# Patient Record
Sex: MALE | Race: BLACK OR AFRICAN AMERICAN | NOT HISPANIC OR LATINO | Employment: FULL TIME | URBAN - METROPOLITAN AREA
[De-identification: names, ages, dates, MRNs, and addresses within clinical notes are randomized per-mention and may not be internally consistent; named-entity substitution may affect disease eponyms.]

---

## 2023-06-08 DIAGNOSIS — E78.00 PURE HYPERCHOLESTEROLEMIA, UNSPECIFIED: ICD-10-CM

## 2023-06-08 DIAGNOSIS — I10 ESSENTIAL (PRIMARY) HYPERTENSION: ICD-10-CM

## 2023-06-08 DIAGNOSIS — R05.3 CHRONIC COUGH: ICD-10-CM

## 2023-06-08 RX ORDER — PANTOPRAZOLE SODIUM 40 MG/1
TABLET, DELAYED RELEASE ORAL
Qty: 90 TABLET | Refills: 3 | Status: SHIPPED | OUTPATIENT
Start: 2023-06-08 | End: 2024-06-08

## 2023-06-08 RX ORDER — ROSUVASTATIN CALCIUM 20 MG/1
TABLET, COATED ORAL
Qty: 90 TABLET | Refills: 3 | Status: SHIPPED | OUTPATIENT
Start: 2023-06-08 | End: 2024-06-08

## 2023-06-08 RX ORDER — FELODIPINE 5 MG/1
TABLET, EXTENDED RELEASE ORAL
Qty: 90 TABLET | Refills: 3 | Status: SHIPPED | OUTPATIENT
Start: 2023-06-08

## 2023-07-06 ENCOUNTER — APPOINTMENT (OUTPATIENT)
Dept: PRIMARY CARE | Facility: CLINIC | Age: 63
End: 2023-07-06
Payer: COMMERCIAL

## 2023-08-03 DIAGNOSIS — E11.9 TYPE 2 DIABETES MELLITUS WITHOUT COMPLICATIONS (MULTI): ICD-10-CM

## 2023-08-03 RX ORDER — METFORMIN HYDROCHLORIDE 1000 MG/1
1000 TABLET ORAL 2 TIMES DAILY
Qty: 180 TABLET | Refills: 3 | Status: SHIPPED | OUTPATIENT
Start: 2023-08-03

## 2023-09-07 ENCOUNTER — OFFICE VISIT (OUTPATIENT)
Dept: PRIMARY CARE | Facility: CLINIC | Age: 63
End: 2023-09-07
Payer: COMMERCIAL

## 2023-09-07 VITALS
HEIGHT: 71 IN | DIASTOLIC BLOOD PRESSURE: 84 MMHG | WEIGHT: 283 LBS | BODY MASS INDEX: 39.62 KG/M2 | TEMPERATURE: 96.2 F | SYSTOLIC BLOOD PRESSURE: 120 MMHG | HEART RATE: 89 BPM

## 2023-09-07 DIAGNOSIS — E78.00 HYPERCHOLESTEROLEMIA: ICD-10-CM

## 2023-09-07 DIAGNOSIS — E11.65 UNCONTROLLED TYPE 2 DIABETES MELLITUS WITH HYPERGLYCEMIA (MULTI): ICD-10-CM

## 2023-09-07 DIAGNOSIS — E66.01 CLASS 2 SEVERE OBESITY DUE TO EXCESS CALORIES WITH SERIOUS COMORBIDITY AND BODY MASS INDEX (BMI) OF 39.0 TO 39.9 IN ADULT (MULTI): ICD-10-CM

## 2023-09-07 DIAGNOSIS — Z12.5 PROSTATE CANCER SCREENING: ICD-10-CM

## 2023-09-07 DIAGNOSIS — Z23 NEED FOR INFLUENZA VACCINATION: ICD-10-CM

## 2023-09-07 DIAGNOSIS — I10 BENIGN ESSENTIAL HYPERTENSION: Primary | ICD-10-CM

## 2023-09-07 DIAGNOSIS — J84.9 INTERSTITIAL LUNG DISEASE (MULTI): ICD-10-CM

## 2023-09-07 PROBLEM — R80.9 MICROALBUMINURIA: Status: ACTIVE | Noted: 2023-09-07

## 2023-09-07 PROBLEM — R05.3 CHRONIC COUGH: Status: ACTIVE | Noted: 2023-09-07

## 2023-09-07 PROCEDURE — 90662 IIV NO PRSV INCREASED AG IM: CPT | Performed by: INTERNAL MEDICINE

## 2023-09-07 PROCEDURE — 99213 OFFICE O/P EST LOW 20 MIN: CPT | Performed by: INTERNAL MEDICINE

## 2023-09-07 PROCEDURE — 90471 IMMUNIZATION ADMIN: CPT | Performed by: INTERNAL MEDICINE

## 2023-09-07 PROCEDURE — 3074F SYST BP LT 130 MM HG: CPT | Performed by: INTERNAL MEDICINE

## 2023-09-07 PROCEDURE — 3008F BODY MASS INDEX DOCD: CPT | Performed by: INTERNAL MEDICINE

## 2023-09-07 PROCEDURE — 1159F MED LIST DOCD IN RCRD: CPT | Performed by: INTERNAL MEDICINE

## 2023-09-07 PROCEDURE — 1036F TOBACCO NON-USER: CPT | Performed by: INTERNAL MEDICINE

## 2023-09-07 PROCEDURE — 3079F DIAST BP 80-89 MM HG: CPT | Performed by: INTERNAL MEDICINE

## 2023-09-07 PROCEDURE — 1160F RVW MEDS BY RX/DR IN RCRD: CPT | Performed by: INTERNAL MEDICINE

## 2023-09-07 RX ORDER — DAPAGLIFLOZIN 5 MG/1
5 TABLET, FILM COATED ORAL
COMMUNITY
End: 2024-03-25

## 2023-09-07 RX ORDER — CARVEDILOL 25 MG/1
25 TABLET ORAL
COMMUNITY
End: 2023-09-07 | Stop reason: SDUPTHER

## 2023-09-07 RX ORDER — FLUTICASONE FUROATE, UMECLIDINIUM BROMIDE AND VILANTEROL TRIFENATATE 100; 62.5; 25 UG/1; UG/1; UG/1
1 POWDER RESPIRATORY (INHALATION)
COMMUNITY
Start: 2023-09-05

## 2023-09-07 RX ORDER — TELMISARTAN AND HYDROCHLORTHIAZIDE 80; 25 MG/1; MG/1
1 TABLET ORAL
COMMUNITY
Start: 2018-08-20 | End: 2024-03-25

## 2023-09-07 RX ORDER — CARVEDILOL 12.5 MG/1
12.5 TABLET ORAL
Qty: 180 TABLET | Refills: 3 | Status: SHIPPED | OUTPATIENT
Start: 2023-09-07

## 2023-09-07 RX ORDER — INSULIN DEGLUDEC 200 U/ML
INJECTION, SOLUTION SUBCUTANEOUS
COMMUNITY
Start: 2018-10-25 | End: 2024-02-19 | Stop reason: SDUPTHER

## 2023-09-07 RX ORDER — INSULIN ASPART 100 [IU]/ML
INJECTION, SOLUTION INTRAVENOUS; SUBCUTANEOUS
COMMUNITY
Start: 2022-06-07

## 2023-09-07 ASSESSMENT — ENCOUNTER SYMPTOMS
DEPRESSION: 0
MUSCULOSKELETAL NEGATIVE: 1
CARDIOVASCULAR NEGATIVE: 1
NEUROLOGICAL NEGATIVE: 1
LOSS OF SENSATION IN FEET: 0
HYPERTENSION: 1
OCCASIONAL FEELINGS OF UNSTEADINESS: 0
RESPIRATORY NEGATIVE: 1
EYES NEGATIVE: 1
GASTROINTESTINAL NEGATIVE: 1
DIABETIC ASSOCIATED SYMPTOMS: 0
HEADACHES: 0
CONSTITUTIONAL NEGATIVE: 1

## 2023-09-07 NOTE — PROGRESS NOTES
Subjective   Patient ID: Adolfo Sanford is a 63 y.o. male who presents for Follow-up (4 mo fu).    Diabetes  He presents for his follow-up diabetic visit. He has type 2 diabetes mellitus. His disease course has been stable. There are no hypoglycemic associated symptoms. Pertinent negatives for hypoglycemia include no headaches. There are no diabetic associated symptoms. Pertinent negatives for diabetes include no chest pain. There are no hypoglycemic complications. Symptoms are stable. Pertinent negatives for diabetic complications include no autonomic neuropathy, CVA or peripheral neuropathy. Current diabetic treatment includes intensive insulin program and oral agent (dual therapy). He is compliant with treatment most of the time. He is following a generally healthy diet. He has not had a previous visit with a dietitian. He rarely participates in exercise. His home blood glucose trend is fluctuating minimally. An ACE inhibitor/angiotensin II receptor blocker is being taken. Eye exam is current.   Hypertension  This is a chronic problem. The current episode started more than 1 year ago. The problem is unchanged. The problem is controlled. Pertinent negatives include no anxiety, chest pain or headaches. Risk factors for coronary artery disease include diabetes mellitus, dyslipidemia, obesity and male gender. Past treatments include calcium channel blockers, beta blockers, angiotensin blockers and diuretics. Compliance problems include diet.  There is no history of CVA. There is no history of chronic renal disease.   Hyperlipidemia  This is a chronic problem. The problem is controlled. Recent lipid tests were reviewed and are normal. He has no history of chronic renal disease. Pertinent negatives include no chest pain. Current antihyperlipidemic treatment includes statins. The current treatment provides mild improvement of lipids. Compliance problems include adherence to diet.         Review of Systems  "  Constitutional: Negative.    HENT: Negative.     Eyes: Negative.    Respiratory: Negative.     Cardiovascular: Negative.  Negative for chest pain.   Gastrointestinal: Negative.    Musculoskeletal: Negative.    Skin: Negative.    Neurological: Negative.  Negative for headaches.       Objective   /84 (BP Location: Left arm, Patient Position: Sitting, BP Cuff Size: Adult)   Pulse 89   Temp 35.7 °C (96.2 °F) (Temporal)   Ht 1.81 m (5' 11.25\")   Wt 128 kg (283 lb)   BMI 39.19 kg/m²     Physical Exam  Vitals reviewed.   Constitutional:       Appearance: Normal appearance. He is obese.   HENT:      Head: Normocephalic and atraumatic.   Eyes:      Conjunctiva/sclera: Conjunctivae normal.   Cardiovascular:      Rate and Rhythm: Normal rate and regular rhythm.      Pulses: Normal pulses.   Pulmonary:      Effort: Pulmonary effort is normal.      Breath sounds: Normal breath sounds.   Abdominal:      Palpations: Abdomen is soft.   Musculoskeletal:         General: Normal range of motion.      Cervical back: Normal range of motion.   Skin:     General: Skin is warm and dry.   Neurological:      General: No focal deficit present.   Psychiatric:         Mood and Affect: Mood normal.       Assessment/Plan   Problem List Items Addressed This Visit       Benign essential hypertension - Primary    Hypercholesterolemia    Interstitial lung disease (CMS/HCC)    Uncontrolled type 2 diabetes mellitus with hyperglycemia (CMS/[facility])     Other Visit Diagnoses       Need for influenza vaccination        Class 2 severe obesity due to excess calories with serious comorbidity and body mass index (BMI) of 39.0 to 39.9 in adult (CMS/HCC)            Patient was evaluated today, problem list was reviewed, problems and concerns addressed, Rx list reviewed and updated, lab and tests were noted and reviewed. Life style changes were discussed, always it works better if we eat plant based diet and plenty of fibres and roughage. Consume " adequate amount of water and avoid alcohol, light to moderate physical activities and stress reduction are always beneficial for ongoing physical well being. Do not forget to have 6 to 7 hours of sleep regularly and avoid late night jamaal screen exposure.   Diabetes is chronic disease, it does not get cured but it can be controlled, in modern medicine there are variety of measures taken to control DM. Usually we want to preserve beta cell functions. Please understand the disease and how our life style can affect control of glycemia. Diabetes leads to macro and microvascular complications and they could be devastating. It is important to have annual eye check and frequent foot inspection and foot inspection. Kidney dysfunction including dialysis, foot amputations, neuropathy, foot ulcers and accelerated atherosclerotic vascular disease are known complications of uncontrolled DM, pt was educated and explained.  He was seen by pulmonary at [facility], CT chest was reviewed, they say it was a UIP, sleep study was done, reports are not available, still he has problem with his diabetes, still he has a fluctuating blood sugars, he remains on extreme dose of insulin therapy, he remains under endocrinology care.  BP readings are stable, obesity persist, struggling to lose weight, current therapeutics were reviewed, carvedilol dose will be reduced, no chest pains, cough is not intractable or persistent, set of laboratories are required as a part of checkup, he will continue to follow at [facility] pulmonary, no breathing compromise, no ER visit, no fluctuations in her blood sugar to the point of hypoglycemic episode.  Years I have been doing him but his struggle continues, we hope that he will be using some sort of a sleep apnea treatment strategies, follow-up in 4 months.

## 2024-01-31 ENCOUNTER — APPOINTMENT (OUTPATIENT)
Dept: ENDOCRINOLOGY | Facility: CLINIC | Age: 64
End: 2024-01-31
Payer: COMMERCIAL

## 2024-02-19 DIAGNOSIS — E11.65 UNCONTROLLED TYPE 2 DIABETES MELLITUS WITH HYPERGLYCEMIA (MULTI): Primary | ICD-10-CM

## 2024-02-19 RX ORDER — INSULIN DEGLUDEC 200 U/ML
INJECTION, SOLUTION SUBCUTANEOUS
Qty: 3 ML | Refills: 0 | Status: SHIPPED | OUTPATIENT
Start: 2024-02-19 | End: 2024-02-20

## 2024-02-20 DIAGNOSIS — E11.65 UNCONTROLLED TYPE 2 DIABETES MELLITUS WITH HYPERGLYCEMIA (MULTI): ICD-10-CM

## 2024-02-20 RX ORDER — INSULIN DEGLUDEC 200 U/ML
INJECTION, SOLUTION SUBCUTANEOUS
Qty: 9 EACH | Refills: 0 | Status: SHIPPED | OUTPATIENT
Start: 2024-02-20 | End: 2024-02-20

## 2024-02-20 RX ORDER — INSULIN DEGLUDEC 200 U/ML
INJECTION, SOLUTION SUBCUTANEOUS
Qty: 45 ML | Refills: 3 | Status: SHIPPED | OUTPATIENT
Start: 2024-02-20

## 2024-02-20 RX ORDER — INSULIN DEGLUDEC 200 U/ML
INJECTION, SOLUTION SUBCUTANEOUS
Qty: 27 ML | Refills: 0 | Status: SHIPPED | OUTPATIENT
Start: 2024-02-20 | End: 2024-02-20

## 2024-03-07 ENCOUNTER — LAB (OUTPATIENT)
Dept: LAB | Facility: LAB | Age: 64
End: 2024-03-07
Payer: COMMERCIAL

## 2024-03-07 ENCOUNTER — OFFICE VISIT (OUTPATIENT)
Dept: PRIMARY CARE | Facility: CLINIC | Age: 64
End: 2024-03-07
Payer: COMMERCIAL

## 2024-03-07 VITALS
HEART RATE: 70 BPM | TEMPERATURE: 97.3 F | WEIGHT: 293 LBS | SYSTOLIC BLOOD PRESSURE: 122 MMHG | HEIGHT: 72 IN | BODY MASS INDEX: 39.68 KG/M2 | DIASTOLIC BLOOD PRESSURE: 80 MMHG

## 2024-03-07 DIAGNOSIS — I10 BENIGN ESSENTIAL HYPERTENSION: ICD-10-CM

## 2024-03-07 DIAGNOSIS — E11.65 UNCONTROLLED TYPE 2 DIABETES MELLITUS WITH HYPERGLYCEMIA (MULTI): ICD-10-CM

## 2024-03-07 DIAGNOSIS — Z12.5 PROSTATE CANCER SCREENING: ICD-10-CM

## 2024-03-07 DIAGNOSIS — E78.00 HYPERCHOLESTEROLEMIA: ICD-10-CM

## 2024-03-07 DIAGNOSIS — J47.0 BRONCHIECTASIS WITH ACUTE LOWER RESPIRATORY INFECTION (MULTI): Primary | ICD-10-CM

## 2024-03-07 DIAGNOSIS — J84.9 INTERSTITIAL LUNG DISEASE (MULTI): ICD-10-CM

## 2024-03-07 LAB
ALBUMIN SERPL BCP-MCNC: 4.1 G/DL (ref 3.4–5)
ALP SERPL-CCNC: 65 U/L (ref 33–136)
ALT SERPL W P-5'-P-CCNC: 14 U/L (ref 10–52)
ANION GAP SERPL CALC-SCNC: 14 MMOL/L (ref 10–20)
AST SERPL W P-5'-P-CCNC: 14 U/L (ref 9–39)
BILIRUB SERPL-MCNC: 0.6 MG/DL (ref 0–1.2)
BUN SERPL-MCNC: 23 MG/DL (ref 6–23)
CALCIUM SERPL-MCNC: 9.5 MG/DL (ref 8.6–10.3)
CHLORIDE SERPL-SCNC: 102 MMOL/L (ref 98–107)
CHOLEST SERPL-MCNC: 151 MG/DL (ref 0–199)
CHOLESTEROL/HDL RATIO: 3.4
CO2 SERPL-SCNC: 26 MMOL/L (ref 21–32)
CREAT SERPL-MCNC: 1 MG/DL (ref 0.5–1.3)
CREAT UR-MCNC: 57.8 MG/DL (ref 20–370)
EGFRCR SERPLBLD CKD-EPI 2021: 84 ML/MIN/1.73M*2
EST. AVERAGE GLUCOSE BLD GHB EST-MCNC: 235 MG/DL
GLUCOSE SERPL-MCNC: 194 MG/DL (ref 74–99)
HBA1C MFR BLD: 9.8 %
HDLC SERPL-MCNC: 44.5 MG/DL
LDLC SERPL CALC-MCNC: 70 MG/DL
MICROALBUMIN UR-MCNC: 36.4 MG/L
MICROALBUMIN/CREAT UR: 63 UG/MG CREAT
NON HDL CHOLESTEROL: 107 MG/DL (ref 0–149)
POTASSIUM SERPL-SCNC: 4.2 MMOL/L (ref 3.5–5.3)
PROT SERPL-MCNC: 7 G/DL (ref 6.4–8.2)
PSA SERPL-MCNC: 1.2 NG/ML
SODIUM SERPL-SCNC: 138 MMOL/L (ref 136–145)
TRIGL SERPL-MCNC: 183 MG/DL (ref 0–149)
VLDL: 37 MG/DL (ref 0–40)

## 2024-03-07 PROCEDURE — 82570 ASSAY OF URINE CREATININE: CPT

## 2024-03-07 PROCEDURE — 3008F BODY MASS INDEX DOCD: CPT | Performed by: INTERNAL MEDICINE

## 2024-03-07 PROCEDURE — 1160F RVW MEDS BY RX/DR IN RCRD: CPT | Performed by: INTERNAL MEDICINE

## 2024-03-07 PROCEDURE — 99213 OFFICE O/P EST LOW 20 MIN: CPT | Performed by: INTERNAL MEDICINE

## 2024-03-07 PROCEDURE — 3079F DIAST BP 80-89 MM HG: CPT | Performed by: INTERNAL MEDICINE

## 2024-03-07 PROCEDURE — 80053 COMPREHEN METABOLIC PANEL: CPT

## 2024-03-07 PROCEDURE — 1159F MED LIST DOCD IN RCRD: CPT | Performed by: INTERNAL MEDICINE

## 2024-03-07 PROCEDURE — 80061 LIPID PANEL: CPT

## 2024-03-07 PROCEDURE — 82043 UR ALBUMIN QUANTITATIVE: CPT

## 2024-03-07 PROCEDURE — 3074F SYST BP LT 130 MM HG: CPT | Performed by: INTERNAL MEDICINE

## 2024-03-07 PROCEDURE — 83036 HEMOGLOBIN GLYCOSYLATED A1C: CPT

## 2024-03-07 PROCEDURE — 1036F TOBACCO NON-USER: CPT | Performed by: INTERNAL MEDICINE

## 2024-03-07 PROCEDURE — 84153 ASSAY OF PSA TOTAL: CPT

## 2024-03-07 PROCEDURE — 36415 COLL VENOUS BLD VENIPUNCTURE: CPT

## 2024-03-07 ASSESSMENT — ENCOUNTER SYMPTOMS
WHEEZING: 0
NEUROLOGICAL NEGATIVE: 1
CONSTITUTIONAL NEGATIVE: 1
SORE THROAT: 0
ABDOMINAL PAIN: 0
RHINORRHEA: 0
SHORTNESS OF BREATH: 1
CARDIOVASCULAR NEGATIVE: 1
GASTROINTESTINAL NEGATIVE: 1
FEVER: 0
SWOLLEN GLANDS: 0
MUSCULOSKELETAL NEGATIVE: 1

## 2024-03-07 NOTE — PROGRESS NOTES
Subjective   Patient ID: Adolfo Sanford is a 63 y.o. male who presents for Follow-up (6 mo fu).  Shortness of Breath  This is a recurrent (Has ILD and bronchiectasis.) problem. The current episode started more than 1 month ago. The problem occurs intermittently. The problem has been waxing and waning. Pertinent negatives include no abdominal pain, chest pain, fever, rash, rhinorrhea, sore throat, swollen glands or wheezing. The treatment provided moderate relief. There is no history of allergies or asthma.     Diabetes  He presents for his follow-up diabetic visit. He has type 2 diabetes mellitus. His disease course has been stable. There are no hypoglycemic associated symptoms. Pertinent negatives for hypoglycemia include no headaches. There are no diabetic associated symptoms. Pertinent negatives for diabetes include no chest pain. There are no hypoglycemic complications. Symptoms are stable. Pertinent negatives for diabetic complications include no autonomic neuropathy, CVA or peripheral neuropathy. Current diabetic treatment includes intensive insulin program and oral agent (dual therapy). He is compliant with treatment most of the time. He is following a generally healthy diet. He has not had a previous visit with a dietitian. He rarely participates in exercise. His home blood glucose trend is fluctuating minimally. An ACE inhibitor/angiotensin II receptor blocker is being taken.seen by endocrine MD, should consider CGM   Hypertension  This is a chronic problem. The current episode started more than 1 year ago. The problem is unchanged. The problem is controlled. Pertinent negatives include no anxiety, chest pain or headaches. Risk factors for coronary artery disease include diabetes mellitus, dyslipidemia, obesity and male gender. Past treatments include calcium channel blockers, beta blockers, angiotensin blockers and diuretics. Compliance problems include diet.  There is no history of CVA. There is no  history of chronic renal disease. Has ILD and seen by pulmonary.  Past Medical History  History reviewed. No pertinent past medical history.    Social History  Social History     Tobacco Use    Smoking status: Never    Smokeless tobacco: Never   Vaping Use    Vaping Use: Never used   Substance Use Topics    Alcohol use: Not Currently    Drug use: Never       Family History   No family history on file.    Allergies:  Allergies   Allergen Reactions    Liraglutide Other        Outpatient Medications:  Current Outpatient Medications   Medication Instructions    carvedilol (COREG) 12.5 mg, oral, 2 times daily with meals    Farxiga 5 mg, oral, Daily before breakfast    felodipine ER (Plendil) 5 mg 24 hr tablet TAKE 1 TABLET BY MOUTH EVERY DAY    metFORMIN (GLUCOPHAGE) 1,000 mg, oral, 2 times daily    NovoLOG FlexPen U-100 Insulin 100 unit/mL (3 mL) pen subcutaneous    pantoprazole (ProtoNix) 40 mg EC tablet TAKE 1 TABLET BY MOUTH EVERY DAY    rosuvastatin (Crestor) 20 mg tablet TAKE 1 TABLET BY MOUTH EVERY DAY    telmisartan-hydrochlorothiazid (MIcarDIS HCT) 80-25 mg tablet 1 tablet, oral, Daily RT    Trelegy Ellipta 100-62.5-25 mcg blister with device 1 puff, inhalation, Daily RT    Tresiba FlexTouch U-200 200 unit/mL (3 mL) injection INJECT 50 UNITS TWICE DAILY        Review of Systems   Constitutional: Negative.  Negative for fever.   HENT: Negative.  Negative for rhinorrhea and sore throat.    Respiratory:  Positive for shortness of breath. Negative for wheezing.    Cardiovascular: Negative.  Negative for chest pain.   Gastrointestinal: Negative.  Negative for abdominal pain.   Musculoskeletal: Negative.    Skin:  Negative for rash.   Neurological: Negative.          Objective       Physical Exam  Vitals reviewed.   Constitutional:       Appearance: Normal appearance. He is obese.   HENT:      Head: Normocephalic and atraumatic.   Eyes:      Conjunctiva/sclera: Conjunctivae normal.   Cardiovascular:      Rate and  Rhythm: Normal rate and regular rhythm.      Pulses: Normal pulses.   Pulmonary:      Effort: Pulmonary effort is normal.      Breath sounds: Normal breath sounds.   Abdominal:      General: Abdomen is protuberant.      Palpations: Abdomen is soft.   Musculoskeletal:         General: Normal range of motion.      Cervical back: Neck supple.   Skin:     General: Skin is warm and dry.   Neurological:      General: No focal deficit present.   Psychiatric:         Mood and Affect: Mood normal.       /80 (BP Location: Left arm, Patient Position: Sitting, BP Cuff Size: Adult)   Pulse 70   Temp 36.3 °C (97.3 °F) (Temporal)   Ht 1.829 m (6')   Wt 133 kg (293 lb)   BMI 39.74 kg/m²      Assessment/Plan   Problem List Items Addressed This Visit       Benign essential hypertension    Interstitial lung disease (CMS/HCC)    Uncontrolled type 2 diabetes mellitus with hyperglycemia (CMS/HCC)    Bronchiectasis with acute lower respiratory infection (CMS/HCC) - Primary   Diabetes is chronic disease, it does not get cured but it can be controlled, in modern medicine there are variety of measures taken to control DM. Usually we want to preserve beta cell functions. Please understand the disease and how our life style can affect control of glycemia. Diabetes leads to macro and microvascular complications and they could be devastating. It is important to have annual eye check and frequent foot inspection and foot inspection. Kidney dysfunction including dialysis, foot amputations, neuropathy, foot ulcers and accelerated atherosclerotic vascular disease are known complications of uncontrolled DM, pt was educated and explained.  Came after a while, seen by endocrine now, really I think that patient can be benefited by continuous glucose monitoring device because of fluctuating blood sugars and inability for him to sustain home-based continuous blood sugar monitoring.  He still works in the night shifts, he has been seen by  pulmonary bronchiectasis and interstitial lung disease has been noticed, cough is intermittent not severe intractable, BP readings are stable, foot care was reviewed, annual ophthalmic checkup was reinstated.  He will proceed with the laboratories today, his exam remains unremarkable with a BMI 39, always difficult to work in the night UTI have been telling him for several years, no ER visit or any acute ailments no symptoms of any endothelial dysfunctions, current therapy will not be disturbed, periodic follow-up and assessment to be done with the endocrine follow-up adequacy of glycemic control needs to be assured, follow-up in 4 months.

## 2024-03-24 DIAGNOSIS — E11.65 TYPE 2 DIABETES MELLITUS WITH HYPERGLYCEMIA (MULTI): ICD-10-CM

## 2024-03-24 DIAGNOSIS — Z79.4 LONG TERM (CURRENT) USE OF INSULIN (MULTI): ICD-10-CM

## 2024-03-25 DIAGNOSIS — I10 ESSENTIAL (PRIMARY) HYPERTENSION: ICD-10-CM

## 2024-03-25 RX ORDER — TELMISARTAN AND HYDROCHLORTHIAZIDE 80; 25 MG/1; MG/1
1 TABLET ORAL DAILY
Qty: 90 TABLET | Refills: 3 | Status: SHIPPED | OUTPATIENT
Start: 2024-03-25

## 2024-03-25 RX ORDER — DAPAGLIFLOZIN 5 MG/1
5 TABLET, FILM COATED ORAL
Qty: 90 TABLET | Refills: 0 | Status: SHIPPED | OUTPATIENT
Start: 2024-03-25

## 2024-04-30 ENCOUNTER — OFFICE VISIT (OUTPATIENT)
Dept: ENDOCRINOLOGY | Facility: CLINIC | Age: 64
End: 2024-04-30
Payer: COMMERCIAL

## 2024-04-30 VITALS
HEART RATE: 80 BPM | WEIGHT: 290 LBS | BODY MASS INDEX: 39.28 KG/M2 | SYSTOLIC BLOOD PRESSURE: 128 MMHG | DIASTOLIC BLOOD PRESSURE: 80 MMHG | HEIGHT: 72 IN

## 2024-04-30 DIAGNOSIS — I10 BENIGN ESSENTIAL HYPERTENSION: ICD-10-CM

## 2024-04-30 DIAGNOSIS — E11.65 UNCONTROLLED TYPE 2 DIABETES MELLITUS WITH HYPERGLYCEMIA (MULTI): ICD-10-CM

## 2024-04-30 DIAGNOSIS — E78.00 HYPERCHOLESTEROLEMIA: Primary | ICD-10-CM

## 2024-04-30 LAB
POC FINGERSTICK BLOOD GLUCOSE: 171 MG/DL (ref 70–100)
POC HEMOGLOBIN A1C: 9.9 % (ref 4.2–6.5)

## 2024-04-30 PROCEDURE — 1160F RVW MEDS BY RX/DR IN RCRD: CPT | Performed by: STUDENT IN AN ORGANIZED HEALTH CARE EDUCATION/TRAINING PROGRAM

## 2024-04-30 PROCEDURE — 3046F HEMOGLOBIN A1C LEVEL >9.0%: CPT | Performed by: STUDENT IN AN ORGANIZED HEALTH CARE EDUCATION/TRAINING PROGRAM

## 2024-04-30 PROCEDURE — 1036F TOBACCO NON-USER: CPT | Performed by: STUDENT IN AN ORGANIZED HEALTH CARE EDUCATION/TRAINING PROGRAM

## 2024-04-30 PROCEDURE — 3048F LDL-C <100 MG/DL: CPT | Performed by: STUDENT IN AN ORGANIZED HEALTH CARE EDUCATION/TRAINING PROGRAM

## 2024-04-30 PROCEDURE — 82962 GLUCOSE BLOOD TEST: CPT | Performed by: STUDENT IN AN ORGANIZED HEALTH CARE EDUCATION/TRAINING PROGRAM

## 2024-04-30 PROCEDURE — 3074F SYST BP LT 130 MM HG: CPT | Performed by: STUDENT IN AN ORGANIZED HEALTH CARE EDUCATION/TRAINING PROGRAM

## 2024-04-30 PROCEDURE — 3079F DIAST BP 80-89 MM HG: CPT | Performed by: STUDENT IN AN ORGANIZED HEALTH CARE EDUCATION/TRAINING PROGRAM

## 2024-04-30 PROCEDURE — 1159F MED LIST DOCD IN RCRD: CPT | Performed by: STUDENT IN AN ORGANIZED HEALTH CARE EDUCATION/TRAINING PROGRAM

## 2024-04-30 PROCEDURE — 3060F POS MICROALBUMINURIA REV: CPT | Performed by: STUDENT IN AN ORGANIZED HEALTH CARE EDUCATION/TRAINING PROGRAM

## 2024-04-30 PROCEDURE — 83036 HEMOGLOBIN GLYCOSYLATED A1C: CPT | Performed by: STUDENT IN AN ORGANIZED HEALTH CARE EDUCATION/TRAINING PROGRAM

## 2024-04-30 PROCEDURE — 3008F BODY MASS INDEX DOCD: CPT | Performed by: STUDENT IN AN ORGANIZED HEALTH CARE EDUCATION/TRAINING PROGRAM

## 2024-04-30 PROCEDURE — 99214 OFFICE O/P EST MOD 30 MIN: CPT | Performed by: STUDENT IN AN ORGANIZED HEALTH CARE EDUCATION/TRAINING PROGRAM

## 2024-04-30 RX ORDER — GLIPIZIDE 10 MG/1
10 TABLET ORAL
Qty: 180 TABLET | Refills: 1 | Status: SHIPPED | OUTPATIENT
Start: 2024-04-30 | End: 2025-04-30

## 2024-04-30 ASSESSMENT — ENCOUNTER SYMPTOMS: CONSTITUTIONAL NEGATIVE: 1

## 2024-04-30 NOTE — PROGRESS NOTES
Subjective   Patient ID: Adolfo Sanford is a 63 y.o. male who presents for Diabetes.  Diabetes       The patient is 64 yo male with DM2 , HTN , HLD presents for DM management.   he is now more complaint on MDI   He gained 6 lbs since last visit   Has been missing Novolog before meals , he has not been using it at all      History  He is a known diabetic for 15 years.   He works 3 rd shift and usually has no time to exercise at [employer]     current regimen :  Triseba 68 units BID  NOvolog 10-10-12 units TID   farxiga 5 mg   Metformin 1 gm BID   Doesnot want to start GLP1 due to fear of side effects        His mother and brother are Diabetic           Review of Systems   Constitutional: Negative.        Objective   Physical Exam  Constitutional:       Appearance: Normal appearance.   Cardiovascular:      Rate and Rhythm: Normal rate and regular rhythm.   Pulmonary:      Effort: Pulmonary effort is normal.      Breath sounds: Normal breath sounds.   Neurological:      Mental Status: He is alert.   Psychiatric:         Mood and Affect: Mood normal.         Assessment/Plan        The patient is 64 yo male with DM2 , HTN , HLD presents for DM management.  - Uncontrolled Dm2 with Hba1c of 9.9 on MDI  ( only using ling acting insulin lately ), SGLt2 and metformin  -HTN with DM nephropathy on ARB.   -HLD controlled on Rosuvastatin 20 mg   -obesity with BMI of 39     plan:  - Increase Triseba to 70 units BID   -start glipizide 10 mg BID   - hold off Novolog 10 -10-12units before meals , add SS2  ( as he has NOT being using any Novolog )  -refusing GLP1  - continue Farxiga 5 mg daily  - continue metformin 1 gm BID   - Advised to adhere to a Diabetic low carb diet.  -Advised to check BG 4 times daily , freestyle thomas not covered by insurance          Nely Gray MD 04/30/24 9:10 AM

## 2024-06-08 DIAGNOSIS — R05.3 CHRONIC COUGH: ICD-10-CM

## 2024-06-08 DIAGNOSIS — E78.00 PURE HYPERCHOLESTEROLEMIA, UNSPECIFIED: ICD-10-CM

## 2024-06-08 RX ORDER — PANTOPRAZOLE SODIUM 40 MG/1
TABLET, DELAYED RELEASE ORAL
Qty: 90 TABLET | Refills: 3 | Status: SHIPPED | OUTPATIENT
Start: 2024-06-08

## 2024-06-08 RX ORDER — ROSUVASTATIN CALCIUM 20 MG/1
TABLET, COATED ORAL
Qty: 90 TABLET | Refills: 3 | Status: SHIPPED | OUTPATIENT
Start: 2024-06-08

## 2024-07-11 ENCOUNTER — APPOINTMENT (OUTPATIENT)
Dept: PRIMARY CARE | Facility: CLINIC | Age: 64
End: 2024-07-11
Payer: COMMERCIAL

## 2024-07-11 VITALS
WEIGHT: 297 LBS | HEART RATE: 77 BPM | BODY MASS INDEX: 40.23 KG/M2 | DIASTOLIC BLOOD PRESSURE: 78 MMHG | TEMPERATURE: 96.7 F | HEIGHT: 72 IN | SYSTOLIC BLOOD PRESSURE: 120 MMHG

## 2024-07-11 DIAGNOSIS — E66.01 CLASS 3 SEVERE OBESITY DUE TO EXCESS CALORIES WITH SERIOUS COMORBIDITY AND BODY MASS INDEX (BMI) OF 40.0 TO 44.9 IN ADULT (MULTI): ICD-10-CM

## 2024-07-11 DIAGNOSIS — J84.9 INTERSTITIAL LUNG DISEASE (MULTI): ICD-10-CM

## 2024-07-11 DIAGNOSIS — E11.65 UNCONTROLLED TYPE 2 DIABETES MELLITUS WITH HYPERGLYCEMIA (MULTI): Primary | ICD-10-CM

## 2024-07-11 DIAGNOSIS — I10 BENIGN ESSENTIAL HYPERTENSION: ICD-10-CM

## 2024-07-11 PROCEDURE — 1160F RVW MEDS BY RX/DR IN RCRD: CPT | Performed by: INTERNAL MEDICINE

## 2024-07-11 PROCEDURE — 3060F POS MICROALBUMINURIA REV: CPT | Performed by: INTERNAL MEDICINE

## 2024-07-11 PROCEDURE — 3078F DIAST BP <80 MM HG: CPT | Performed by: INTERNAL MEDICINE

## 2024-07-11 PROCEDURE — 3046F HEMOGLOBIN A1C LEVEL >9.0%: CPT | Performed by: INTERNAL MEDICINE

## 2024-07-11 PROCEDURE — 3048F LDL-C <100 MG/DL: CPT | Performed by: INTERNAL MEDICINE

## 2024-07-11 PROCEDURE — 99213 OFFICE O/P EST LOW 20 MIN: CPT | Performed by: INTERNAL MEDICINE

## 2024-07-11 PROCEDURE — 1159F MED LIST DOCD IN RCRD: CPT | Performed by: INTERNAL MEDICINE

## 2024-07-11 PROCEDURE — 3074F SYST BP LT 130 MM HG: CPT | Performed by: INTERNAL MEDICINE

## 2024-07-11 PROCEDURE — 1036F TOBACCO NON-USER: CPT | Performed by: INTERNAL MEDICINE

## 2024-07-11 PROCEDURE — 3008F BODY MASS INDEX DOCD: CPT | Performed by: INTERNAL MEDICINE

## 2024-07-11 RX ORDER — INSULIN DEGLUDEC 200 U/ML
INJECTION, SOLUTION SUBCUTANEOUS
Status: SHIPPED
Start: 2024-07-11

## 2024-07-11 ASSESSMENT — ENCOUNTER SYMPTOMS
GASTROINTESTINAL NEGATIVE: 1
FATIGUE: 0
ARTHRALGIAS: 1
DIZZINESS: 0
CONSTITUTIONAL NEGATIVE: 1
COUGH: 0
APNEA: 0
CARDIOVASCULAR NEGATIVE: 1
SHORTNESS OF BREATH: 1

## 2024-07-11 NOTE — PROGRESS NOTES
Subjective   Patient ID: Adolfo Sanford is a 63 y.o. male who presents for Follow-up (4 mo fu).  HPI  Diabetes  He presents for his follow-up diabetic visit. He has type 2 diabetes mellitus. His disease course has been stable. There are no hypoglycemic associated symptoms. Pertinent negatives for hypoglycemia include no headaches. There are no diabetic associated symptoms. Pertinent negatives for diabetes include no chest pain. There are no hypoglycemic complications. Symptoms are stable. Pertinent negatives for diabetic complications include no autonomic neuropathy, CVA or peripheral neuropathy. Current diabetic treatment includes intensive insulin program and oral agent (dual therapy). He is compliant with treatment most of the time. He is following a generally healthy diet. He has not had a previous visit with a dietitian. He rarely participates in exercise. His home blood glucose trend is fluctuating minimally. An ACE inhibitor/angiotensin II receptor blocker is being taken.seen by endocrine MD, should consider CGM   Hypertension  This is a chronic problem. The current episode started more than 1 year ago. The problem is unchanged. The problem is controlled. Pertinent negatives include no anxiety, chest pain or headaches. Risk factors for coronary artery disease include diabetes mellitus, dyslipidemia, obesity and male gender. Past treatments include calcium channel blockers, beta blockers, angiotensin blockers and diuretics. Compliance problems include diet.  There is no history of CVA. There is no history of chronic renal disease. Has ILD.  Past Medical History  History reviewed. No pertinent past medical history.    Social History  Social History     Tobacco Use    Smoking status: Never    Smokeless tobacco: Never   Vaping Use    Vaping status: Never Used   Substance Use Topics    Alcohol use: Not Currently    Drug use: Never       Family History   No family history on file.    Allergies:  Allergies    Allergen Reactions    Liraglutide Other        Outpatient Medications:  Current Outpatient Medications   Medication Instructions    carvedilol (COREG) 12.5 mg, oral, 2 times daily (morning and late afternoon)    Farxiga 5 mg, oral, Daily before breakfast    felodipine ER (Plendil) 5 mg 24 hr tablet TAKE 1 TABLET BY MOUTH EVERY DAY    glipiZIDE (GLUCOTROL) 10 mg, oral, 2 times daily before meals    metFORMIN (GLUCOPHAGE) 1,000 mg, oral, 2 times daily    NovoLOG FlexPen U-100 Insulin 100 unit/mL (3 mL) pen subcutaneous    pantoprazole (ProtoNix) 40 mg EC tablet TAKE 1 TABLET BY MOUTH EVERY DAY    rosuvastatin (Crestor) 20 mg tablet TAKE 1 TABLET BY MOUTH EVERY DAY    telmisartan-hydrochlorothiazid (MIcarDIS HCT) 80-25 mg tablet 1 tablet, oral, Daily    Trelegy Ellipta 100-62.5-25 mcg blister with device 1 puff, inhalation, Daily RT    Tresiba FlexTouch U-200 200 unit/mL (3 mL) injection INJECT 50 UNITS TWICE DAILY        Review of Systems   Constitutional: Negative.  Negative for fatigue.   Respiratory:  Positive for shortness of breath. Negative for apnea and cough.    Cardiovascular: Negative.    Gastrointestinal: Negative.    Musculoskeletal:  Positive for arthralgias.   Skin: Negative.    Neurological:  Negative for dizziness.         Objective       Physical Exam  Vitals reviewed.   Constitutional:       Appearance: Normal appearance. He is obese.   HENT:      Head: Normocephalic and atraumatic.   Eyes:      Conjunctiva/sclera: Conjunctivae normal.   Cardiovascular:      Rate and Rhythm: Normal rate and regular rhythm.   Pulmonary:      Effort: Pulmonary effort is normal.      Breath sounds: Normal breath sounds.   Abdominal:      General: Abdomen is protuberant.      Palpations: Abdomen is soft.   Musculoskeletal:         General: Tenderness present.      Cervical back: Neck supple.   Skin:     General: Skin is warm and dry.   Neurological:      General: No focal deficit present.   Psychiatric:         Mood  and Affect: Mood normal.     /78 (BP Location: Right arm, Patient Position: Sitting, BP Cuff Size: Adult)   Pulse 77   Temp 35.9 °C (96.7 °F) (Temporal)   Ht 1.829 m (6')   Wt 135 kg (297 lb)   BMI 40.28 kg/m²      Assessment/Plan   Problem List Items Addressed This Visit       Benign essential hypertension    Interstitial lung disease (Multi)    Uncontrolled type 2 diabetes mellitus with hyperglycemia (Multi) - Primary     Other Visit Diagnoses       Class 3 severe obesity due to excess calories with serious comorbidity and body mass index (BMI) of 40.0 to 44.9 in adult (Multi)            Diabetes is chronic disease, it does not get cured but it can be controlled, in modern medicine there are variety of measures taken to control DM. Usually we want to preserve beta cell functions. Please understand the disease and how our life style can affect control of glycemia. Diabetes leads to macro and microvascular complications and they could be devastating. It is important to have annual eye check and frequent foot inspection and foot inspection. Kidney dysfunction including dialysis, foot amputations, neuropathy, foot ulcers and accelerated atherosclerotic vascular disease are known complications of uncontrolled DM, pt was educated and explained.  Patient was seen by endocrinologist now started glipizide, stop the Premeal fixed dose of insulin, on Tresiba 70 units twice a day, he continued to have this interstitial lung disease, his fatigue is improved, shortness of breath is same, coughing has improved.  Still I would say he has uncontrolled diabetes, insurance company will not approve continuous glucose monitoring device, he does not check his blood sugars, he remains heavyset obese male patient, he is determined to follow-up with endocrinologist, BP readings are excellent.  No orders were given today, last times laboratories and interim pulmonary and endocrine evaluation were reviewed and explained, please  make sure to have a self monitored blood sugar on a periodic basis, in-house A1c will be done at endocrine office, continue inhaler, follow-up in 4 months.

## 2024-08-26 ENCOUNTER — APPOINTMENT (OUTPATIENT)
Dept: ENDOCRINOLOGY | Facility: CLINIC | Age: 64
End: 2024-08-26
Payer: COMMERCIAL

## 2024-08-30 DIAGNOSIS — I10 BENIGN ESSENTIAL HYPERTENSION: ICD-10-CM

## 2024-09-01 RX ORDER — CARVEDILOL 12.5 MG/1
12.5 TABLET ORAL
Qty: 180 TABLET | Refills: 3 | Status: SHIPPED | OUTPATIENT
Start: 2024-09-01

## 2024-09-04 DIAGNOSIS — I10 ESSENTIAL (PRIMARY) HYPERTENSION: ICD-10-CM

## 2024-09-04 DIAGNOSIS — E11.9 TYPE 2 DIABETES MELLITUS WITHOUT COMPLICATIONS (MULTI): ICD-10-CM

## 2024-09-04 RX ORDER — METFORMIN HYDROCHLORIDE 1000 MG/1
1000 TABLET ORAL 2 TIMES DAILY
Qty: 180 TABLET | Refills: 3 | Status: SHIPPED | OUTPATIENT
Start: 2024-09-04

## 2024-09-04 RX ORDER — FELODIPINE 5 MG/1
5 TABLET, EXTENDED RELEASE ORAL DAILY
Qty: 90 TABLET | Refills: 3 | Status: SHIPPED | OUTPATIENT
Start: 2024-09-04

## 2024-11-12 ENCOUNTER — APPOINTMENT (OUTPATIENT)
Dept: PRIMARY CARE | Facility: CLINIC | Age: 64
End: 2024-11-12
Payer: COMMERCIAL

## 2024-12-01 DIAGNOSIS — E11.65 UNCONTROLLED TYPE 2 DIABETES MELLITUS WITH HYPERGLYCEMIA: ICD-10-CM

## 2024-12-02 RX ORDER — GLIPIZIDE 10 MG/1
10 TABLET ORAL
Qty: 60 TABLET | Refills: 0 | Status: SHIPPED | OUTPATIENT
Start: 2024-12-02 | End: 2025-12-02

## 2024-12-31 ENCOUNTER — APPOINTMENT (OUTPATIENT)
Dept: ENDOCRINOLOGY | Facility: CLINIC | Age: 64
End: 2024-12-31
Payer: COMMERCIAL

## 2025-01-02 DIAGNOSIS — E11.65 UNCONTROLLED TYPE 2 DIABETES MELLITUS WITH HYPERGLYCEMIA: ICD-10-CM

## 2025-01-06 RX ORDER — GLIPIZIDE 10 MG/1
10 TABLET ORAL
Qty: 60 TABLET | Refills: 0 | Status: SHIPPED | OUTPATIENT
Start: 2025-01-06

## 2025-01-30 ENCOUNTER — HOSPITAL ENCOUNTER (OUTPATIENT)
Dept: RADIOLOGY | Facility: HOSPITAL | Age: 65
Discharge: HOME | End: 2025-01-30
Payer: COMMERCIAL

## 2025-01-30 ENCOUNTER — OFFICE VISIT (OUTPATIENT)
Dept: ORTHOPEDIC SURGERY | Facility: CLINIC | Age: 65
End: 2025-01-30
Payer: COMMERCIAL

## 2025-01-30 VITALS — HEIGHT: 72 IN | WEIGHT: 290 LBS | BODY MASS INDEX: 39.28 KG/M2

## 2025-01-30 DIAGNOSIS — G57.00 PIRIFORMIS SYNDROME, UNSPECIFIED LATERALITY: ICD-10-CM

## 2025-01-30 DIAGNOSIS — M25.551 PAIN OF RIGHT HIP: ICD-10-CM

## 2025-01-30 DIAGNOSIS — M76.30 ILIOTIBIAL BAND SYNDROME, UNSPECIFIED LATERALITY: ICD-10-CM

## 2025-01-30 DIAGNOSIS — M67.959 TENDINOPATHY OF GLUTEAL REGION: Primary | ICD-10-CM

## 2025-01-30 PROCEDURE — 99214 OFFICE O/P EST MOD 30 MIN: CPT | Performed by: STUDENT IN AN ORGANIZED HEALTH CARE EDUCATION/TRAINING PROGRAM

## 2025-01-30 PROCEDURE — 3008F BODY MASS INDEX DOCD: CPT | Performed by: STUDENT IN AN ORGANIZED HEALTH CARE EDUCATION/TRAINING PROGRAM

## 2025-01-30 PROCEDURE — 73502 X-RAY EXAM HIP UNI 2-3 VIEWS: CPT | Mod: RT

## 2025-01-30 RX ORDER — NAPROXEN 500 MG/1
500 TABLET ORAL
Qty: 28 TABLET | Refills: 1 | Status: SHIPPED | OUTPATIENT
Start: 2025-01-30 | End: 2025-02-27

## 2025-01-30 ASSESSMENT — PATIENT HEALTH QUESTIONNAIRE - PHQ9
1. LITTLE INTEREST OR PLEASURE IN DOING THINGS: NOT AT ALL
2. FEELING DOWN, DEPRESSED OR HOPELESS: NOT AT ALL
SUM OF ALL RESPONSES TO PHQ9 QUESTIONS 1 AND 2: 0

## 2025-01-30 NOTE — PROGRESS NOTES
Acute Injury Established Patient Visit    HPI: Adolfo is a 64 y.o.male who presents today with new complaints of right hip pain.  Patient Dr. Silveira's who has had a history of floro-guided hip injections in the past.  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  He had approximately 2 years of relief from the last injection.  He denies any interval falls or injuries.  He states that for the last 2 months or so he has been having some lateral sided right hip pain that he notices worse when he is at work, walking long distance, and change of direction.  He denies any current groin pain.  He denies any popping clicking.  He denies any swelling or bruising.  He has been using a cane and ibuprofen.  Denies any back pain and numbness and tingling.    Plan: For this right IT band tendinitis, right piriformis syndrome, right gluteus medius tendinopathy, as well as an element of underlying osteoarthritis as well as cam deformity, which do not seem to be the primary  of his pain at this time, we will have him start physical therapy, naproxen, rest, ice, elevation, activity modification, and will have him back in approximately 4 weeks for reevaluation.  If he is not better at that time, consider a corticosteroid injection of the greater trochanteric bursa.  If these measures fail, or if he begins to develop more classic groin pain related to his underlying osteoarthritis versus femoral acetabular impingement, would consider an intra-articular injection at that time.  He understands and agrees with the plan.    Assessment:   Problem List Items Addressed This Visit    None  Visit Diagnoses       Tendinopathy of gluteal region    -  Primary    Relevant Orders    Referral to Physical Therapy    Pain of right hip        Relevant Orders    XR hip right with pelvis when performed 2 or 3 views    Referral to Physical Therapy    Iliotibial band syndrome, unspecified laterality         Relevant Orders    Referral to Physical Therapy    Piriformis syndrome, unspecified laterality        Relevant Orders    Referral to Physical Therapy            Diagnostics: Reviewed all relevant imaging including x-ray, MRI, CT, and US.      Procedure:  Procedures    Physical Exam:  GENERAL:  No obvious acute distress.  NEURO:  Distally neurovascularly intact.  Sensation intact to light touch.  Extremity: Right hip exam:  Skin healthy and intact  No gross swelling or ecchymosis  No erythema or warmth  TENDER over the greater trochanter  TENDER over the gluteus medius tendon  TENDER over the piriformis tendon  TENDER over the proximal IT band  Negative PRANEETH  Negative FADIR  No pain with internal and external rotation  Full strength in hip flexion, adduction, and abduction  Mildly antalgic gait    Exam of the lumbar spine:  No tenderness along the midline of the lumbar and sacral spine;  No step-offs along the midline of the lumbar and sacral spine;  Flexion is full with no pain;  Extension is full with no pain;  Sidebending is full and symmetric with no pain;  Rotation is full and symmetric with no pain;  Negative straight leg raise on the right;  Negative straight leg raise on the left;  Full strength and range of motion throughout the bilateral lower extremities; and  Nonantalgic gait.    Orders Placed This Encounter    XR hip right with pelvis when performed 2 or 3 views    Referral to Physical Therapy      At the conclusion of the visit there were no further questions by the patient/family regarding their plan of care.  Patient was instructed to call or return with any issues, questions, or concerns regarding their injury and/or treatment plan described above.     01/30/25 at 2:04 PM - Sheldon Vance DO    Office: [phone]    This note was prepared using voice recognition software.  The details of this note are correct and have been reviewed, and corrected to the best of my ability.  Some  grammatical errors may persist related to the Dragon software.

## 2025-02-06 ENCOUNTER — APPOINTMENT (OUTPATIENT)
Dept: PRIMARY CARE | Facility: CLINIC | Age: 65
End: 2025-02-06
Payer: COMMERCIAL

## 2025-02-06 VITALS
DIASTOLIC BLOOD PRESSURE: 78 MMHG | WEIGHT: 310 LBS | HEIGHT: 72 IN | SYSTOLIC BLOOD PRESSURE: 122 MMHG | TEMPERATURE: 96.4 F | BODY MASS INDEX: 41.99 KG/M2 | HEART RATE: 82 BPM

## 2025-02-06 DIAGNOSIS — I10 BENIGN ESSENTIAL HYPERTENSION: Primary | ICD-10-CM

## 2025-02-06 DIAGNOSIS — J84.9 INTERSTITIAL LUNG DISEASE (MULTI): ICD-10-CM

## 2025-02-06 DIAGNOSIS — E66.813 CLASS 3 SEVERE OBESITY DUE TO EXCESS CALORIES WITH SERIOUS COMORBIDITY AND BODY MASS INDEX (BMI) OF 40.0 TO 44.9 IN ADULT: ICD-10-CM

## 2025-02-06 DIAGNOSIS — E11.65 UNCONTROLLED TYPE 2 DIABETES MELLITUS WITH HYPERGLYCEMIA: ICD-10-CM

## 2025-02-06 DIAGNOSIS — J47.0 BRONCHIECTASIS WITH ACUTE LOWER RESPIRATORY INFECTION (MULTI): ICD-10-CM

## 2025-02-06 DIAGNOSIS — E66.01 CLASS 3 SEVERE OBESITY DUE TO EXCESS CALORIES WITH SERIOUS COMORBIDITY AND BODY MASS INDEX (BMI) OF 40.0 TO 44.9 IN ADULT: ICD-10-CM

## 2025-02-06 PROCEDURE — 99213 OFFICE O/P EST LOW 20 MIN: CPT | Performed by: INTERNAL MEDICINE

## 2025-02-06 PROCEDURE — 3074F SYST BP LT 130 MM HG: CPT | Performed by: INTERNAL MEDICINE

## 2025-02-06 PROCEDURE — 1160F RVW MEDS BY RX/DR IN RCRD: CPT | Performed by: INTERNAL MEDICINE

## 2025-02-06 PROCEDURE — 3078F DIAST BP <80 MM HG: CPT | Performed by: INTERNAL MEDICINE

## 2025-02-06 PROCEDURE — 3008F BODY MASS INDEX DOCD: CPT | Performed by: INTERNAL MEDICINE

## 2025-02-06 PROCEDURE — 1036F TOBACCO NON-USER: CPT | Performed by: INTERNAL MEDICINE

## 2025-02-06 PROCEDURE — 1159F MED LIST DOCD IN RCRD: CPT | Performed by: INTERNAL MEDICINE

## 2025-02-06 RX ORDER — INSULIN DEGLUDEC 200 U/ML
INJECTION, SOLUTION SUBCUTANEOUS
Qty: 3 ML | Refills: 3 | Status: SHIPPED | OUTPATIENT
Start: 2025-02-06

## 2025-02-06 RX ORDER — TELMISARTAN AND HYDROCHLORTHIAZIDE 80; 25 MG/1; MG/1
1 TABLET ORAL DAILY
Qty: 90 TABLET | Refills: 3 | Status: SHIPPED | OUTPATIENT
Start: 2025-02-06

## 2025-02-06 ASSESSMENT — ENCOUNTER SYMPTOMS
OCCASIONAL FEELINGS OF UNSTEADINESS: 0
GASTROINTESTINAL NEGATIVE: 1
CONSTITUTIONAL NEGATIVE: 1
DIZZINESS: 0
LOSS OF SENSATION IN FEET: 0
ARTHRALGIAS: 1
SHORTNESS OF BREATH: 0
WEAKNESS: 0
CARDIOVASCULAR NEGATIVE: 1
COUGH: 1
DEPRESSION: 0

## 2025-02-06 ASSESSMENT — COLUMBIA-SUICIDE SEVERITY RATING SCALE - C-SSRS
6. HAVE YOU EVER DONE ANYTHING, STARTED TO DO ANYTHING, OR PREPARED TO DO ANYTHING TO END YOUR LIFE?: NO
2. HAVE YOU ACTUALLY HAD ANY THOUGHTS OF KILLING YOURSELF?: NO
1. IN THE PAST MONTH, HAVE YOU WISHED YOU WERE DEAD OR WISHED YOU COULD GO TO SLEEP AND NOT WAKE UP?: NO

## 2025-02-06 ASSESSMENT — PATIENT HEALTH QUESTIONNAIRE - PHQ9
2. FEELING DOWN, DEPRESSED OR HOPELESS: NOT AT ALL
SUM OF ALL RESPONSES TO PHQ9 QUESTIONS 1 AND 2: 0
1. LITTLE INTEREST OR PLEASURE IN DOING THINGS: NOT AT ALL

## 2025-02-06 NOTE — PROGRESS NOTES
Subjective   Patient ID: Adolfo Sanford is a 64 y.o. male who presents for Follow-up (6 mo fu).  HPI  Diabetes  He presents for his follow-up diabetic visit. He has type 2 diabetes mellitus. His disease course has been stable. There are no hypoglycemic associated symptoms. Pertinent negatives for hypoglycemia include no headaches. There are no diabetic associated symptoms. Pertinent negatives for diabetes include no chest pain. There are no hypoglycemic complications. Symptoms are stable. Pertinent negatives for diabetic complications include no autonomic neuropathy, CVA or peripheral neuropathy. Current diabetic treatment includes intensive insulin program and oral agent (dual therapy). He is compliant with treatment most of the time. He is following a generally healthy diet. He has not had a previous visit with a dietitian. He rarely participates in exercise. His home blood glucose trend is fluctuating minimally. An ACE inhibitor/angiotensin II receptor blocker is being taken.seen by endocrine MD, on glipizide now.  Hypertension  This is a chronic problem. The current episode started more than 1 year ago. The problem is unchanged. The problem is controlled. Pertinent negatives include no anxiety, chest pain or headaches. Risk factors for coronary artery disease include diabetes mellitus, dyslipidemia, obesity and male gender. Past treatments include calcium channel blockers, beta blockers, angiotensin blockers and diuretics. Compliance problems include diet.  There is no history of CVA. There is no history of chronic renal disease. Has ILD.  Past Medical History  History reviewed. No pertinent past medical history.    Social History  Social History     Tobacco Use    Smoking status: Never    Smokeless tobacco: Never   Vaping Use    Vaping status: Never Used   Substance Use Topics    Alcohol use: Not Currently    Drug use: Never       Family History   No family history on file.    Allergies:  Allergies    Allergen Reactions    Liraglutide Other        Outpatient Medications:  Current Outpatient Medications   Medication Instructions    carvedilol (COREG) 12.5 mg, oral, 2 times daily (morning and late afternoon)    Farxiga 5 mg, oral, Daily before breakfast    felodipine ER (PLENDIL) 5 mg, oral, Daily, Do not crush, chew, or split.    glipiZIDE (GLUCOTROL) 10 mg, oral, 2 times daily before meals    metFORMIN (GLUCOPHAGE) 1,000 mg, oral, 2 times daily    naproxen (NAPROSYN) 500 mg, oral, 2 times daily (morning and late afternoon)    pantoprazole (ProtoNix) 40 mg EC tablet TAKE 1 TABLET BY MOUTH EVERY DAY    rosuvastatin (Crestor) 20 mg tablet TAKE 1 TABLET BY MOUTH EVERY DAY    telmisartan-hydrochlorothiazid (MIcarDIS HCT) 80-25 mg tablet 1 tablet, oral, Daily    Trelegy Ellipta 100-62.5-25 mcg blister with device 1 puff, inhalation, Daily RT    Tresiba FlexTouch U-200 200 unit/mL (3 mL) injection INJECT 70 UNITS TWICE DAILY        Review of Systems   Constitutional: Negative.         Wt gain   Respiratory:  Positive for cough. Negative for shortness of breath.    Cardiovascular: Negative.  Negative for leg swelling.   Gastrointestinal: Negative.    Musculoskeletal:  Positive for arthralgias.   Neurological:  Negative for dizziness and weakness.         Objective       Physical Exam  Vitals reviewed.   Constitutional:       Appearance: Normal appearance. He is obese.   HENT:      Head: Normocephalic.   Eyes:      Conjunctiva/sclera: Conjunctivae normal.   Cardiovascular:      Rate and Rhythm: Normal rate and regular rhythm.   Pulmonary:      Effort: Pulmonary effort is normal.      Breath sounds: Normal breath sounds. No rales.   Abdominal:      General: Abdomen is protuberant.      Palpations: Abdomen is soft.   Musculoskeletal:         General: Normal range of motion.      Cervical back: Neck supple.      Right lower leg: Edema present.      Left lower leg: Edema present.   Skin:     General: Skin is warm and dry.  "  Neurological:      General: No focal deficit present.       /78 (BP Location: Left arm, Patient Position: Sitting, BP Cuff Size: Large adult)   Pulse 82   Temp 35.8 °C (96.4 °F) (Temporal)   Ht 1.835 m (6' 0.25\")   Wt 141 kg (310 lb)   BMI 41.75 kg/m²      Assessment/Plan   Problem List Items Addressed This Visit       Benign essential hypertension - Primary    Interstitial lung disease (Multi)    Uncontrolled type 2 diabetes mellitus with hyperglycemia    Bronchiectasis with acute lower respiratory infection (Multi)   Some bronchiectasis with the subreticular pattern on the periphery of the lung there is a traction bronchiectasis seen on the last CT.  That is the reason he was having intermittent cough, he has been seen by endocrine and I suggested him to stay with same endocrine, he should consider GLP-1 analog, he was reluctant to do so but now he is in agreement because he gained weight, he kept on gaining weight, he walks in a third shift, he is unable to maintain his metabolic disorder in a controlled fashion.  Lets start with the semaglutide 0.25 mg once a week, after a month he will call us that he has tolerated this therapy and it is not expensive to him so we will increase the dose to 0.5 and gradually we will increase dose up to 2 mg.  By doing that he will continue to receive glipizide, Tresiba, metformin, SGLT2 inhibitor.  BP readings are excellent, previously has a coronary angiogram done, he is unable to sustain usage of CPAP, footcare was reviewed, no sores, no wound, right eye no evaluation has been done.  Several laboratories were ordered today, he is a tall heavyset male patient, class III obesity remains, it is mandatory that he work seriously for weight loss and semaglutide will be helpful here, it is hard for him to keep up with appointments so I suggested him to have a follow-up in 6 months.  His screening test are reviewed.  "

## 2025-02-12 ENCOUNTER — APPOINTMENT (OUTPATIENT)
Dept: PHYSICAL THERAPY | Facility: CLINIC | Age: 65
End: 2025-02-12
Payer: COMMERCIAL

## 2025-02-27 ENCOUNTER — APPOINTMENT (OUTPATIENT)
Dept: ORTHOPEDIC SURGERY | Facility: CLINIC | Age: 65
End: 2025-02-27
Payer: COMMERCIAL

## 2025-02-27 DIAGNOSIS — M70.61 TROCHANTERIC BURSITIS OF RIGHT HIP: Primary | ICD-10-CM

## 2025-02-27 DIAGNOSIS — G57.00 PIRIFORMIS SYNDROME, UNSPECIFIED LATERALITY: ICD-10-CM

## 2025-02-27 DIAGNOSIS — M67.959 TENDINOPATHY OF GLUTEAL REGION: ICD-10-CM

## 2025-02-27 DIAGNOSIS — E11.65 UNCONTROLLED TYPE 2 DIABETES MELLITUS WITH HYPERGLYCEMIA: ICD-10-CM

## 2025-02-27 DIAGNOSIS — M76.30 ILIOTIBIAL BAND SYNDROME, UNSPECIFIED LATERALITY: ICD-10-CM

## 2025-02-27 RX ORDER — INSULIN DEGLUDEC 200 U/ML
50 INJECTION, SOLUTION SUBCUTANEOUS 2 TIMES DAILY
Qty: 45 ML | Refills: 1 | Status: SHIPPED | OUTPATIENT
Start: 2025-02-27 | End: 2025-08-26

## 2025-02-27 RX ORDER — BETAMETHASONE SODIUM PHOSPHATE AND BETAMETHASONE ACETATE 3; 3 MG/ML; MG/ML
12 INJECTION, SUSPENSION INTRA-ARTICULAR; INTRALESIONAL; INTRAMUSCULAR; SOFT TISSUE
Status: COMPLETED | OUTPATIENT
Start: 2025-02-27 | End: 2025-02-27

## 2025-02-27 RX ORDER — LIDOCAINE HYDROCHLORIDE 10 MG/ML
6 INJECTION, SOLUTION INFILTRATION; PERINEURAL
Status: COMPLETED | OUTPATIENT
Start: 2025-02-27 | End: 2025-02-27

## 2025-02-27 RX ADMIN — LIDOCAINE HYDROCHLORIDE 6 ML: 10 INJECTION, SOLUTION INFILTRATION; PERINEURAL at 10:46

## 2025-02-27 RX ADMIN — BETAMETHASONE SODIUM PHOSPHATE AND BETAMETHASONE ACETATE 12 MG: 3; 3 INJECTION, SUSPENSION INTRA-ARTICULAR; INTRALESIONAL; INTRAMUSCULAR; SOFT TISSUE at 10:46

## 2025-02-27 NOTE — PROGRESS NOTES
Acute Injury Established Patient Visit    HPI: Adolfo is a 64 y.o.male who presents today for follow-up of his right IT band tendinitis, right piriformis syndrome, right gluteus medius tendinopathy as well as an element of underlying osteoarthritis and cam deformity.  He has been doing physical therapy for about 2 weeks and has been doing his home exercises.  He is about 10 to 20% better.  Pain is still worse laterally, but does have some pain anteriorly.  The naproxen seems to help.  He would like to try a cortisone injection today.    1/30/2025, he presented with new complaints of right hip pain.  Patient Dr. Silveira's who has had a history of floro-guided hip injections in the past.  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  He had approximately 2 years of relief from the last injection.  He denies any interval falls or injuries.  He states that for the last 2 months or so he has been having some lateral sided right hip pain that he notices worse when he is at work, walking long distance, and change of direction.  He denies any current groin pain.  He denies any popping clicking.  He denies any swelling or bruising.  He has been using a cane and ibuprofen.  Denies any back pain and numbness and tingling.    Plan: Today, on 2/27/2025, we will try a corticosteroid injection of the right trochanteric bursa and surrounding tendons to hopefully give him a boost in his physical therapy and help him feel better faster.  Injection was tolerated well without complications.  Postinjection instructions given.  Will follow-up in 6 weeks.  If he is not having improvement at that time, would consider injection intra-articularly into the right hip.    01/30/2025, for this right IT band tendinitis, right piriformis syndrome, right gluteus medius tendinopathy, as well as an element of underlying osteoarthritis as well as cam deformity, which do not seem to be the primary   of his pain at this time, we will have him start physical therapy, naproxen, rest, ice, elevation, activity modification, and will have him back in approximately 4 weeks for reevaluation.  If he is not better at that time, consider a corticosteroid injection of the greater trochanteric bursa.  If these measures fail, or if he begins to develop more classic groin pain related to his underlying osteoarthritis versus femoral acetabular impingement, would consider an intra-articular injection at that time.  He understands and agrees with the plan.    Assessment:   Problem List Items Addressed This Visit    None  Visit Diagnoses       Trochanteric bursitis of right hip    -  Primary    Iliotibial band syndrome, unspecified laterality        Piriformis syndrome, unspecified laterality        Tendinopathy of gluteal region                  Diagnostics: Reviewed all relevant imaging including x-ray, MRI, CT, and US.      Procedure:  L Inj/Asp: R greater trochanteric bursa on 2/27/2025 10:46 AM  Indications: pain  Details: 22 G needle, lateral approach  Medications: 6 mL lidocaine 10 mg/mL (1 %); 12 mg betamethasone acet,sod phos 6 mg/mL  Outcome: tolerated well, no immediate complications  Procedure, treatment alternatives, risks and benefits explained, specific risks discussed. Consent was given by the patient. Immediately prior to procedure a time out was called to verify the correct patient, procedure, equipment, support staff and site/side marked as required. Patient was prepped and draped in the usual sterile fashion.           Physical Exam:  GENERAL:  No obvious acute distress.  NEURO:  Distally neurovascularly intact.  Sensation intact to light touch.  Extremity: Right hip exam:  Skin healthy and intact  No gross swelling or ecchymosis  No erythema or warmth  TENDER over the greater trochanter  TENDER over the gluteus medius tendon  TENDER over the piriformis tendon  TENDER over the proximal IT band  Negative  PRANEETH  Negative FADIR  No pain with internal and external rotation  Full strength in hip flexion, adduction, and abduction  Mildly antalgic gait    Exam of the lumbar spine:  No tenderness along the midline of the lumbar and sacral spine;  No step-offs along the midline of the lumbar and sacral spine;  Flexion is full with no pain;  Extension is full with no pain;  Sidebending is full and symmetric with no pain;  Rotation is full and symmetric with no pain;  Negative straight leg raise on the right;  Negative straight leg raise on the left;  Full strength and range of motion throughout the bilateral lower extremities; and  Nonantalgic gait.    Orders Placed This Encounter    L Inj/Asp      At the conclusion of the visit there were no further questions by the patient/family regarding their plan of care.  Patient was instructed to call or return with any issues, questions, or concerns regarding their injury and/or treatment plan described above.     02/27/25 at 10:46 AM - Sheldon Vance, DO    Office: [phone]    This note was prepared using voice recognition software.  The details of this note are correct and have been reviewed, and corrected to the best of my ability.  Some grammatical errors may persist related to the Dragon software.

## 2025-03-05 LAB
ALBUMIN SERPL-MCNC: 4.5 G/DL (ref 3.6–5.1)
ALBUMIN/CREAT UR: 485 MG/G CREAT
ALP SERPL-CCNC: 61 U/L (ref 35–144)
ALT SERPL-CCNC: 15 U/L (ref 9–46)
ANION GAP SERPL CALCULATED.4IONS-SCNC: 9 MMOL/L (CALC) (ref 7–17)
AST SERPL-CCNC: 16 U/L (ref 10–35)
BILIRUB SERPL-MCNC: 0.7 MG/DL (ref 0.2–1.2)
BUN SERPL-MCNC: 16 MG/DL (ref 7–25)
CALCIUM SERPL-MCNC: 9.8 MG/DL (ref 8.6–10.3)
CHLORIDE SERPL-SCNC: 103 MMOL/L (ref 98–110)
CHOLEST SERPL-MCNC: 137 MG/DL
CHOLEST/HDLC SERPL: 2.7 (CALC)
CO2 SERPL-SCNC: 29 MMOL/L (ref 20–32)
CREAT SERPL-MCNC: 0.67 MG/DL (ref 0.7–1.35)
CREAT UR-MCNC: 136 MG/DL (ref 20–320)
EGFRCR SERPLBLD CKD-EPI 2021: 103 ML/MIN/1.73M2
EST. AVERAGE GLUCOSE BLD GHB EST-MCNC: 249 MG/DL
EST. AVERAGE GLUCOSE BLD GHB EST-SCNC: 13.8 MMOL/L
GLUCOSE SERPL-MCNC: 89 MG/DL (ref 65–99)
HBA1C MFR BLD: 10.3 % OF TOTAL HGB
HDLC SERPL-MCNC: 50 MG/DL
LDLC SERPL CALC-MCNC: 68 MG/DL (CALC)
MICROALBUMIN UR-MCNC: 65.9 MG/DL
NONHDLC SERPL-MCNC: 87 MG/DL (CALC)
POTASSIUM SERPL-SCNC: 4.2 MMOL/L (ref 3.5–5.3)
PROT SERPL-MCNC: 7.1 G/DL (ref 6.1–8.1)
SODIUM SERPL-SCNC: 141 MMOL/L (ref 135–146)
TRIGL SERPL-MCNC: 105 MG/DL

## 2025-03-30 DIAGNOSIS — E66.01 CLASS 3 SEVERE OBESITY DUE TO EXCESS CALORIES WITH SERIOUS COMORBIDITY AND BODY MASS INDEX (BMI) OF 40.0 TO 44.9 IN ADULT: ICD-10-CM

## 2025-03-30 DIAGNOSIS — E66.813 CLASS 3 SEVERE OBESITY DUE TO EXCESS CALORIES WITH SERIOUS COMORBIDITY AND BODY MASS INDEX (BMI) OF 40.0 TO 44.9 IN ADULT: ICD-10-CM

## 2025-03-30 DIAGNOSIS — E11.65 UNCONTROLLED TYPE 2 DIABETES MELLITUS WITH HYPERGLYCEMIA: ICD-10-CM

## 2025-03-30 RX ORDER — SEMAGLUTIDE 0.68 MG/ML
0.25 INJECTION, SOLUTION SUBCUTANEOUS
Qty: 4 ML | Refills: 3 | Status: SHIPPED | OUTPATIENT
Start: 2025-03-30

## 2025-04-10 ENCOUNTER — APPOINTMENT (OUTPATIENT)
Dept: ORTHOPEDIC SURGERY | Facility: CLINIC | Age: 65
End: 2025-04-10
Payer: COMMERCIAL

## 2025-04-10 DIAGNOSIS — M25.851 FEMOROACETABULAR IMPINGEMENT OF RIGHT HIP: ICD-10-CM

## 2025-04-10 DIAGNOSIS — G57.00 PIRIFORMIS SYNDROME, UNSPECIFIED LATERALITY: ICD-10-CM

## 2025-04-10 DIAGNOSIS — M67.959 TENDINOPATHY OF GLUTEAL REGION: ICD-10-CM

## 2025-04-10 DIAGNOSIS — M76.30 ILIOTIBIAL BAND SYNDROME, UNSPECIFIED LATERALITY: Primary | ICD-10-CM

## 2025-04-10 DIAGNOSIS — M16.11 PRIMARY OSTEOARTHRITIS OF RIGHT HIP: ICD-10-CM

## 2025-04-10 DIAGNOSIS — M70.61 TROCHANTERIC BURSITIS OF RIGHT HIP: ICD-10-CM

## 2025-04-10 PROCEDURE — 99214 OFFICE O/P EST MOD 30 MIN: CPT | Performed by: STUDENT IN AN ORGANIZED HEALTH CARE EDUCATION/TRAINING PROGRAM

## 2025-04-10 RX ORDER — MELOXICAM 15 MG/1
15 TABLET ORAL DAILY
Qty: 30 TABLET | Refills: 1 | Status: SHIPPED | OUTPATIENT
Start: 2025-04-10 | End: 2025-06-09

## 2025-04-10 NOTE — PROGRESS NOTES
Acute Injury Established Patient Visit    HPI: Adolfo is a 64 y.o.male who presents today for follow-up of his right IT band tendinitis, right piriformis syndrome, right gluteus medius tendinopathy and an element of osteoarthritis and cam deformity.  He is status post trochanteric bursa injection on 2/27/2025, which unfortunately has not provided him with much relief.  He is about the same as prior visit.  He has done 6 sessions of physical therapy.  Laterally he feels better, but is still having some anterior pain as well as into the groin.  He denies any back pain and numbness tingling.  Denies interval falls or injuries.    On 2/27/2025, he presented for follow-up of his right IT band tendinitis, right piriformis syndrome, right gluteus medius tendinopathy as well as an element of underlying osteoarthritis and cam deformity.  He has been doing physical therapy for about 2 weeks and has been doing his home exercises.  He is about 10 to 20% better.  Pain is still worse laterally, but does have some pain anteriorly.  The naproxen seems to help.  He would like to try a cortisone injection today.    1/30/2025, he presented with new complaints of right hip pain.  Patient Dr. Silveira's who has had a history of floro-guided hip injections in the past.  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  He had approximately 2 years of relief from the last injection.  He denies any interval falls or injuries.  He states that for the last 2 months or so he has been having some lateral sided right hip pain that he notices worse when he is at work, walking long distance, and change of direction.  He denies any current groin pain.  He denies any popping clicking.  He denies any swelling or bruising.  He has been using a cane and ibuprofen.  Denies any back pain and numbness and tingling.    Plan: Today, on 4/10/2025, we will start him on meloxicam, continue with home exercises that  do not cause him any more pain, and will plan on seeing him back in 4 weeks and doing a possible intra-articular hip injection if he is not better at that point for possible labrum issues.    On 2/27/2025, we will try a corticosteroid injection of the right trochanteric bursa and surrounding tendons to hopefully give him a boost in his physical therapy and help him feel better faster.  Injection was tolerated well without complications.  Postinjection instructions given.  Will follow-up in 6 weeks.  If he is not having improvement at that time, would consider injection intra-articularly into the right hip.    01/30/2025, for this right IT band tendinitis, right piriformis syndrome, right gluteus medius tendinopathy, as well as an element of underlying osteoarthritis as well as cam deformity, which do not seem to be the primary  of his pain at this time, we will have him start physical therapy, naproxen, rest, ice, elevation, activity modification, and will have him back in approximately 4 weeks for reevaluation.  If he is not better at that time, consider a corticosteroid injection of the greater trochanteric bursa.  If these measures fail, or if he begins to develop more classic groin pain related to his underlying osteoarthritis versus femoral acetabular impingement, would consider an intra-articular injection at that time.  He understands and agrees with the plan.    Assessment:   Problem List Items Addressed This Visit    None  Visit Diagnoses       Iliotibial band syndrome, unspecified laterality    -  Primary    Relevant Medications    meloxicam (Mobic) 15 mg tablet    Piriformis syndrome, unspecified laterality        Relevant Medications    meloxicam (Mobic) 15 mg tablet    Tendinopathy of gluteal region        Relevant Medications    meloxicam (Mobic) 15 mg tablet    Trochanteric bursitis of right hip        Relevant Medications    meloxicam (Mobic) 15 mg tablet    Femoroacetabular impingement of  right hip        Relevant Medications    meloxicam (Mobic) 15 mg tablet    Primary osteoarthritis of right hip        Relevant Medications    meloxicam (Mobic) 15 mg tablet                Diagnostics: Reviewed all relevant imaging including x-ray, MRI, CT, and US.      Procedure:  Procedures    Physical Exam:  GENERAL:  No obvious acute distress.  NEURO:  Distally neurovascularly intact.  Sensation intact to light touch.  Extremity: Right hip exam:  Skin healthy and intact  No gross swelling or ecchymosis  No erythema or warmth  TENDER over the greater trochanter  TENDER over the gluteus medius tendon  TENDER over the piriformis tendon  TENDER over the proximal IT band  Negative PRANEETH  Negative FADIR  No pain with internal and external rotation  Full strength in hip flexion, adduction, and abduction  Mildly antalgic gait    Exam of the lumbar spine:  No tenderness along the midline of the lumbar and sacral spine;  No step-offs along the midline of the lumbar and sacral spine;  Flexion is full with no pain;  Extension is full with no pain;  Sidebending is full and symmetric with no pain;  Rotation is full and symmetric with no pain;  Negative straight leg raise on the right;  Negative straight leg raise on the left;  Full strength and range of motion throughout the bilateral lower extremities; and  Nonantalgic gait.    Orders Placed This Encounter    L Inj/Asp: R greater trochanteric bursa    meloxicam (Mobic) 15 mg tablet      At the conclusion of the visit there were no further questions by the patient/family regarding their plan of care.  Patient was instructed to call or return with any issues, questions, or concerns regarding their injury and/or treatment plan described above.     04/10/25 at 11:45 AM - Sheldon Vance DO    Office: [phone]    This note was prepared using voice recognition software.  The details of this note are correct and have been reviewed, and corrected to the best of my ability.   Some grammatical errors may persist related to the Dragon software.

## 2025-05-08 ENCOUNTER — HOSPITAL ENCOUNTER (OUTPATIENT)
Dept: RADIOLOGY | Facility: EXTERNAL LOCATION | Age: 65
Discharge: HOME | End: 2025-05-08

## 2025-05-08 ENCOUNTER — OFFICE VISIT (OUTPATIENT)
Dept: ORTHOPEDIC SURGERY | Facility: CLINIC | Age: 65
End: 2025-05-08
Payer: COMMERCIAL

## 2025-05-08 DIAGNOSIS — M16.11 PRIMARY OSTEOARTHRITIS OF RIGHT HIP: ICD-10-CM

## 2025-05-08 DIAGNOSIS — M70.61 TROCHANTERIC BURSITIS OF RIGHT HIP: ICD-10-CM

## 2025-05-08 DIAGNOSIS — M25.851 FEMOROACETABULAR IMPINGEMENT OF RIGHT HIP: Primary | ICD-10-CM

## 2025-05-08 RX ORDER — BETAMETHASONE SODIUM PHOSPHATE AND BETAMETHASONE ACETATE 3; 3 MG/ML; MG/ML
18 INJECTION, SUSPENSION INTRA-ARTICULAR; INTRALESIONAL; INTRAMUSCULAR; SOFT TISSUE
Status: COMPLETED | OUTPATIENT
Start: 2025-05-08 | End: 2025-05-08

## 2025-05-08 RX ORDER — LIDOCAINE HYDROCHLORIDE 10 MG/ML
6 INJECTION, SOLUTION INFILTRATION; PERINEURAL
Status: COMPLETED | OUTPATIENT
Start: 2025-05-08 | End: 2025-05-08

## 2025-05-08 RX ADMIN — LIDOCAINE HYDROCHLORIDE 6 ML: 10 INJECTION, SOLUTION INFILTRATION; PERINEURAL at 15:52

## 2025-05-08 RX ADMIN — BETAMETHASONE SODIUM PHOSPHATE AND BETAMETHASONE ACETATE 18 MG: 3; 3 INJECTION, SUSPENSION INTRA-ARTICULAR; INTRALESIONAL; INTRAMUSCULAR; SOFT TISSUE at 15:52

## 2025-05-08 NOTE — PROGRESS NOTES
Follow-Up Patient Visit  Patient ID: Adolfo Sanford is a 64 y.o. male.    History of Present Illness  The patient is a 64-year-old male presenting for follow-up of right hip issues.    Right Hip Issues  - IT band tendinitis  - Piriformis syndrome  - Gluteus medius tendinopathy  - Osteoarthritis  - Cam deformity    Supplemental information: He seeks an intra-articular injection as the bursa injection provided minimal relief. Proceeding with the injection today.    Assessment & Plan  1. Right hip issues:  - Diagnosed with IT band tendinitis, piriformis syndrome, gluteus medius tendinopathy, osteoarthritis, and cam deformity  - Bursa injection provided minimal relief  - Administering intra-articular injection today which was tolerated well without complications.  Postinjection instructions given.       Orders Placed This Encounter    L Inj/Asp    Point of Care Ultrasound       1. Femoroacetabular impingement of right hip    2. Trochanteric bursitis of right hip    3. Primary osteoarthritis of right hip        L Inj/Asp: R hip joint on 5/8/2025 3:52 PM  Indications: pain  Details: 22 G needle, ultrasound-guided anterolateral approach  Medications: 6 mL lidocaine 10 mg/mL (1 %); 18 mg betamethasone acet,sod phos 6 mg/mL  Outcome: tolerated well, no immediate complications  Procedure, treatment alternatives, risks and benefits explained, specific risks discussed. Consent was given by the patient. Immediately prior to procedure a time out was called to verify the correct patient, procedure, equipment, support staff and site/side marked as required. Patient was prepped and draped in the usual sterile fashion.           Physical Exam  Exam of the right hip reveals skin is intact no signs infection.  There is no significant swelling, bruising, erythema, warmth.    Results      At the conclusion of the visit there were no further questions by the patient/family regarding their plan of care.  Patient was instructed to  call or return with any issues, questions, or concerns regarding their injury and/or treatment plan described above.      This medical note was created with the assistance of artificial intelligence (AI) for documentation purposes. The content has been reviewed and confirmed by the healthcare provider for accuracy and completeness. Patient consented to the use of audio recording and use of AI during their visit.   05/08/25 at 3:53 PM - Sheldon Vance DO  Office:  [phone]

## 2025-05-09 ENCOUNTER — APPOINTMENT (OUTPATIENT)
Dept: ORTHOPEDIC SURGERY | Facility: CLINIC | Age: 65
End: 2025-05-09
Payer: COMMERCIAL

## 2025-05-31 DIAGNOSIS — E78.00 PURE HYPERCHOLESTEROLEMIA, UNSPECIFIED: ICD-10-CM

## 2025-05-31 DIAGNOSIS — R05.3 CHRONIC COUGH: ICD-10-CM

## 2025-06-01 RX ORDER — ROSUVASTATIN CALCIUM 20 MG/1
20 TABLET, COATED ORAL DAILY
Qty: 90 TABLET | Refills: 3 | Status: SHIPPED | OUTPATIENT
Start: 2025-06-01

## 2025-06-01 RX ORDER — PANTOPRAZOLE SODIUM 40 MG/1
40 TABLET, DELAYED RELEASE ORAL DAILY
Qty: 90 TABLET | Refills: 3 | Status: SHIPPED | OUTPATIENT
Start: 2025-06-01

## 2025-06-18 DIAGNOSIS — M16.11 PRIMARY OSTEOARTHRITIS OF RIGHT HIP: ICD-10-CM

## 2025-06-18 DIAGNOSIS — G57.00 PIRIFORMIS SYNDROME, UNSPECIFIED LATERALITY: ICD-10-CM

## 2025-06-18 DIAGNOSIS — M70.61 TROCHANTERIC BURSITIS OF RIGHT HIP: ICD-10-CM

## 2025-06-18 DIAGNOSIS — M25.851 FEMOROACETABULAR IMPINGEMENT OF RIGHT HIP: ICD-10-CM

## 2025-06-18 DIAGNOSIS — M67.959 TENDINOPATHY OF GLUTEAL REGION: ICD-10-CM

## 2025-06-18 DIAGNOSIS — M76.30 ILIOTIBIAL BAND SYNDROME, UNSPECIFIED LATERALITY: ICD-10-CM

## 2025-06-18 RX ORDER — MELOXICAM 15 MG/1
15 TABLET ORAL DAILY
Qty: 30 TABLET | Refills: 1 | Status: SHIPPED | OUTPATIENT
Start: 2025-06-18

## 2025-06-19 ENCOUNTER — APPOINTMENT (OUTPATIENT)
Dept: ORTHOPEDIC SURGERY | Facility: CLINIC | Age: 65
End: 2025-06-19
Payer: COMMERCIAL

## 2025-06-19 DIAGNOSIS — M25.851 FEMOROACETABULAR IMPINGEMENT OF RIGHT HIP: ICD-10-CM

## 2025-06-19 DIAGNOSIS — M16.11 PRIMARY OSTEOARTHRITIS OF RIGHT HIP: Primary | ICD-10-CM

## 2025-06-19 PROCEDURE — 99213 OFFICE O/P EST LOW 20 MIN: CPT | Performed by: STUDENT IN AN ORGANIZED HEALTH CARE EDUCATION/TRAINING PROGRAM

## 2025-06-19 NOTE — PROGRESS NOTES
Follow-Up Patient Visit  Patient ID: Adolfo Sanford is a 64 y.o. male.    History of Present Illness  The patient is a 64-year-old male here for follow-up of his right hip issues status post intra-articular injection of the right hip on 05/08/2025 for his femoral acetabular impingement. He has also been experiencing IT band tendinitis, piriformis syndrome, and gluteus medius tendinopathy. He has a good cam deformity and some osteoarthritis. He was initially seen on 01/30/2025 for this issue and last seen on 05/08/2025. He is here for follow-up to assess the effectiveness of the injection.    He reports significant improvement in his condition following the intra-articular injection administered on 05/08/2025. Over the past 6 weeks, he estimates an 80% enhancement in his mobility, with no recent falls or injuries. He feels better overall and is able to get around more easily.    Assessment & Plan  1. Right hip pain, primarily right hip osteoarthritis and femoral acetabular impingement:    The intra-articular injection administered on 05/08/2025 has resulted in an 80% improvement in symptoms. Continued injections every 3 to 4 months are recommended if they provide at least 2 to 2.5 months of relief. If pain recurs by the end of July or early August, contact the office to schedule another injection.       No orders of the defined types were placed in this encounter.      1. Primary osteoarthritis of right hip    2. Femoroacetabular impingement of right hip        Procedures    Physical Exam  Musculoskeletal:  Gait: Easier to get up than before, getting around a little easier  Right Hip: Decent hip flexion, internal and external rotation not bad    Results      At the conclusion of the visit there were no further questions by the patient/family regarding their plan of care.  Patient was instructed to call or return with any issues, questions, or concerns regarding their injury and/or treatment plan described  above.      This medical note was created with the assistance of artificial intelligence (AI) for documentation purposes. The content has been reviewed and confirmed by the healthcare provider for accuracy and completeness. Patient consented to the use of audio recording and use of AI during their visit.   06/19/25 at 8:35 AM - Sheldon Vance DO  Office:  [phone]

## 2025-07-21 ENCOUNTER — HOSPITAL ENCOUNTER (INPATIENT)
Facility: HOSPITAL | Age: 65
LOS: 1 days | Discharge: HOME | End: 2025-07-23
Attending: EMERGENCY MEDICINE | Admitting: STUDENT IN AN ORGANIZED HEALTH CARE EDUCATION/TRAINING PROGRAM
Payer: COMMERCIAL

## 2025-07-21 ENCOUNTER — APPOINTMENT (OUTPATIENT)
Dept: RADIOLOGY | Facility: HOSPITAL | Age: 65
End: 2025-07-21
Payer: COMMERCIAL

## 2025-07-21 ENCOUNTER — APPOINTMENT (OUTPATIENT)
Dept: CARDIOLOGY | Facility: HOSPITAL | Age: 65
End: 2025-07-21
Payer: COMMERCIAL

## 2025-07-21 DIAGNOSIS — J18.9 PNEUMONIA OF LEFT LOWER LOBE DUE TO INFECTIOUS ORGANISM: ICD-10-CM

## 2025-07-21 DIAGNOSIS — R79.89 ELEVATED TROPONIN: ICD-10-CM

## 2025-07-21 DIAGNOSIS — J96.01 ACUTE RESPIRATORY FAILURE WITH HYPOXIA: Primary | ICD-10-CM

## 2025-07-21 PROBLEM — J15.69 PNEUMONIA DUE TO GRAM-NEGATIVE BACTERIA (MULTI): Status: ACTIVE | Noted: 2025-07-21

## 2025-07-21 LAB
ALBUMIN SERPL BCP-MCNC: 4.5 G/DL (ref 3.4–5)
ALP SERPL-CCNC: 71 U/L (ref 33–136)
ALT SERPL W P-5'-P-CCNC: 14 U/L (ref 10–52)
ANION GAP SERPL CALC-SCNC: 11 MMOL/L (ref 10–20)
APPARATUS: ABNORMAL
ARTERIAL PATENCY WRIST A: ABNORMAL
ARTERIAL PATENCY WRIST A: POSITIVE
AST SERPL W P-5'-P-CCNC: 17 U/L (ref 9–39)
ATRIAL RATE: 100 BPM
ATRIAL RATE: 128 BPM
BASE EXCESS BLDA CALC-SCNC: 0.6 MMOL/L (ref -2–3)
BASOPHILS # BLD AUTO: 0.05 X10*3/UL (ref 0–0.1)
BASOPHILS NFR BLD AUTO: 0.6 %
BILIRUB SERPL-MCNC: 1 MG/DL (ref 0–1.2)
BNP SERPL-MCNC: 478 PG/ML (ref 0–99)
BODY TEMPERATURE: ABNORMAL
BUN SERPL-MCNC: 12 MG/DL (ref 6–23)
CALCIUM SERPL-MCNC: 9.1 MG/DL (ref 8.6–10.3)
CARDIAC TROPONIN I PNL SERPL HS: 41 NG/L (ref 0–20)
CARDIAC TROPONIN I PNL SERPL HS: 73 NG/L (ref 0–20)
CHLORIDE SERPL-SCNC: 106 MMOL/L (ref 98–107)
CO2 SERPL-SCNC: 27 MMOL/L (ref 21–32)
CREAT SERPL-MCNC: 0.7 MG/DL (ref 0.5–1.3)
EGFRCR SERPLBLD CKD-EPI 2021: >90 ML/MIN/1.73M*2
EOSINOPHIL # BLD AUTO: 0.24 X10*3/UL (ref 0–0.7)
EOSINOPHIL NFR BLD AUTO: 2.8 %
EPAP CMH2O: 6 CM H2O
ERYTHROCYTE [DISTWIDTH] IN BLOOD BY AUTOMATED COUNT: 14.3 % (ref 11.5–14.5)
GLUCOSE BLD MANUAL STRIP-MCNC: 261 MG/DL (ref 74–99)
GLUCOSE BLD MANUAL STRIP-MCNC: 398 MG/DL (ref 74–99)
GLUCOSE SERPL-MCNC: 224 MG/DL (ref 74–99)
HCO3 BLDA-SCNC: 27.4 MMOL/L (ref 22–26)
HCT VFR BLD AUTO: 56.4 % (ref 41–52)
HGB BLD-MCNC: 18.9 G/DL (ref 13.5–17.5)
IMM GRANULOCYTES # BLD AUTO: 0.02 X10*3/UL (ref 0–0.7)
IMM GRANULOCYTES NFR BLD AUTO: 0.2 % (ref 0–0.9)
INHALED O2 CONCENTRATION: 100 %
INR PPP: 1 (ref 0.9–1.1)
IPAP CMH2O: 20 CM H2O
LACTATE SERPL-SCNC: 1.1 MMOL/L (ref 0.4–2)
LACTATE SERPL-SCNC: 2.2 MMOL/L (ref 0.4–2)
LYMPHOCYTES # BLD AUTO: 2.82 X10*3/UL (ref 1.2–4.8)
LYMPHOCYTES NFR BLD AUTO: 33.1 %
MAGNESIUM SERPL-MCNC: 1.84 MG/DL (ref 1.6–2.4)
MCH RBC QN AUTO: 29.5 PG (ref 26–34)
MCHC RBC AUTO-ENTMCNC: 33.5 G/DL (ref 32–36)
MCV RBC AUTO: 88 FL (ref 80–100)
MONOCYTES # BLD AUTO: 0.97 X10*3/UL (ref 0.1–1)
MONOCYTES NFR BLD AUTO: 11.4 %
NEUTROPHILS # BLD AUTO: 4.42 X10*3/UL (ref 1.2–7.7)
NEUTROPHILS NFR BLD AUTO: 51.9 %
NRBC BLD-RTO: 0 /100 WBCS (ref 0–0)
OXYHGB MFR BLDA: 98.3 % (ref 94–98)
P AXIS: 21 DEGREES
P AXIS: 32 DEGREES
P OFFSET: 183 MS
P OFFSET: 199 MS
P ONSET: 128 MS
P ONSET: 159 MS
PCO2 BLDA: 52 MM HG (ref 38–42)
PH BLDA: 7.33 PH (ref 7.38–7.42)
PLATELET # BLD AUTO: 233 X10*3/UL (ref 150–450)
PO2 BLDA: 325 MM HG (ref 85–95)
POTASSIUM SERPL-SCNC: 3.6 MMOL/L (ref 3.5–5.3)
PR INTERVAL: 124 MS
PR INTERVAL: 150 MS
PROT SERPL-MCNC: 8.1 G/DL (ref 6.4–8.2)
PROTHROMBIN TIME: 10.8 SECONDS (ref 9.8–12.4)
Q ONSET: 203 MS
Q ONSET: 221 MS
QRS COUNT: 16 BEATS
QRS COUNT: 22 BEATS
QRS DURATION: 130 MS
QRS DURATION: 146 MS
QT INTERVAL: 332 MS
QT INTERVAL: 414 MS
QTC CALCULATION(BAZETT): 484 MS
QTC CALCULATION(BAZETT): 525 MS
QTC FREDERICIA: 427 MS
QTC FREDERICIA: 485 MS
R AXIS: -64 DEGREES
R AXIS: 122 DEGREES
RBC # BLD AUTO: 6.41 X10*6/UL (ref 4.5–5.9)
SAO2 % BLDA: 100 % (ref 94–100)
SITE OF ARTERIAL PUNCTURE: ABNORMAL
SODIUM SERPL-SCNC: 140 MMOL/L (ref 136–145)
SPECIMEN DRAWN FROM PATIENT: ABNORMAL
T AXIS: -2 DEGREES
T AXIS: 27 DEGREES
T OFFSET: 387 MS
T OFFSET: 410 MS
VENTILATOR MODE: ABNORMAL
VENTILATOR RATE: 18 BPM
VENTRICULAR RATE: 128 BPM
VENTRICULAR RATE: 97 BPM
WBC # BLD AUTO: 8.5 X10*3/UL (ref 4.4–11.3)

## 2025-07-21 PROCEDURE — 2500000002 HC RX 250 W HCPCS SELF ADMINISTERED DRUGS (ALT 637 FOR MEDICARE OP, ALT 636 FOR OP/ED): Performed by: EMERGENCY MEDICINE

## 2025-07-21 PROCEDURE — 2500000005 HC RX 250 GENERAL PHARMACY W/O HCPCS: Performed by: EMERGENCY MEDICINE

## 2025-07-21 PROCEDURE — G0378 HOSPITAL OBSERVATION PER HR: HCPCS

## 2025-07-21 PROCEDURE — 83605 ASSAY OF LACTIC ACID: CPT | Performed by: EMERGENCY MEDICINE

## 2025-07-21 PROCEDURE — 85025 COMPLETE CBC W/AUTO DIFF WBC: CPT | Performed by: EMERGENCY MEDICINE

## 2025-07-21 PROCEDURE — 96365 THER/PROPH/DIAG IV INF INIT: CPT | Mod: 59

## 2025-07-21 PROCEDURE — 94660 CPAP INITIATION&MGMT: CPT

## 2025-07-21 PROCEDURE — 94640 AIRWAY INHALATION TREATMENT: CPT

## 2025-07-21 PROCEDURE — 93005 ELECTROCARDIOGRAM TRACING: CPT

## 2025-07-21 PROCEDURE — 2500000004 HC RX 250 GENERAL PHARMACY W/ HCPCS (ALT 636 FOR OP/ED): Performed by: EMERGENCY MEDICINE

## 2025-07-21 PROCEDURE — 2500000004 HC RX 250 GENERAL PHARMACY W/ HCPCS (ALT 636 FOR OP/ED): Mod: JZ

## 2025-07-21 PROCEDURE — 84484 ASSAY OF TROPONIN QUANT: CPT | Performed by: EMERGENCY MEDICINE

## 2025-07-21 PROCEDURE — 5A09357 ASSISTANCE WITH RESPIRATORY VENTILATION, LESS THAN 24 CONSECUTIVE HOURS, CONTINUOUS POSITIVE AIRWAY PRESSURE: ICD-10-PCS

## 2025-07-21 PROCEDURE — 2500000001 HC RX 250 WO HCPCS SELF ADMINISTERED DRUGS (ALT 637 FOR MEDICARE OP)

## 2025-07-21 PROCEDURE — 96366 THER/PROPH/DIAG IV INF ADDON: CPT

## 2025-07-21 PROCEDURE — 82947 ASSAY GLUCOSE BLOOD QUANT: CPT

## 2025-07-21 PROCEDURE — 2500000005 HC RX 250 GENERAL PHARMACY W/O HCPCS

## 2025-07-21 PROCEDURE — 96367 TX/PROPH/DG ADDL SEQ IV INF: CPT

## 2025-07-21 PROCEDURE — 36415 COLL VENOUS BLD VENIPUNCTURE: CPT | Performed by: EMERGENCY MEDICINE

## 2025-07-21 PROCEDURE — 71045 X-RAY EXAM CHEST 1 VIEW: CPT

## 2025-07-21 PROCEDURE — 71045 X-RAY EXAM CHEST 1 VIEW: CPT | Mod: FOREIGN READ | Performed by: RADIOLOGY

## 2025-07-21 PROCEDURE — 96375 TX/PRO/DX INJ NEW DRUG ADDON: CPT

## 2025-07-21 PROCEDURE — 96372 THER/PROPH/DIAG INJ SC/IM: CPT

## 2025-07-21 PROCEDURE — 99291 CRITICAL CARE FIRST HOUR: CPT | Mod: 25 | Performed by: EMERGENCY MEDICINE

## 2025-07-21 PROCEDURE — 80053 COMPREHEN METABOLIC PANEL: CPT | Performed by: EMERGENCY MEDICINE

## 2025-07-21 PROCEDURE — 83735 ASSAY OF MAGNESIUM: CPT | Performed by: EMERGENCY MEDICINE

## 2025-07-21 PROCEDURE — 71275 CT ANGIOGRAPHY CHEST: CPT

## 2025-07-21 PROCEDURE — 85610 PROTHROMBIN TIME: CPT | Performed by: EMERGENCY MEDICINE

## 2025-07-21 PROCEDURE — 83880 ASSAY OF NATRIURETIC PEPTIDE: CPT | Performed by: EMERGENCY MEDICINE

## 2025-07-21 PROCEDURE — 82810 BLOOD GASES O2 SAT ONLY: CPT | Performed by: EMERGENCY MEDICINE

## 2025-07-21 PROCEDURE — 71275 CT ANGIOGRAPHY CHEST: CPT | Mod: FOREIGN READ | Performed by: RADIOLOGY

## 2025-07-21 PROCEDURE — 2550000001 HC RX 255 CONTRASTS: Performed by: EMERGENCY MEDICINE

## 2025-07-21 PROCEDURE — 36600 WITHDRAWAL OF ARTERIAL BLOOD: CPT

## 2025-07-21 PROCEDURE — 99223 1ST HOSP IP/OBS HIGH 75: CPT

## 2025-07-21 PROCEDURE — 2500000002 HC RX 250 W HCPCS SELF ADMINISTERED DRUGS (ALT 637 FOR MEDICARE OP, ALT 636 FOR OP/ED)

## 2025-07-21 RX ORDER — ENOXAPARIN SODIUM 100 MG/ML
40 INJECTION SUBCUTANEOUS EVERY 24 HOURS
Status: DISCONTINUED | OUTPATIENT
Start: 2025-07-21 | End: 2025-07-23 | Stop reason: HOSPADM

## 2025-07-21 RX ORDER — IPRATROPIUM BROMIDE AND ALBUTEROL SULFATE 2.5; .5 MG/3ML; MG/3ML
3 SOLUTION RESPIRATORY (INHALATION)
Status: DISCONTINUED | OUTPATIENT
Start: 2025-07-21 | End: 2025-07-23

## 2025-07-21 RX ORDER — ONDANSETRON HYDROCHLORIDE 2 MG/ML
4 INJECTION, SOLUTION INTRAVENOUS EVERY 8 HOURS PRN
Status: DISCONTINUED | OUTPATIENT
Start: 2025-07-21 | End: 2025-07-23 | Stop reason: HOSPADM

## 2025-07-21 RX ORDER — ACETAMINOPHEN 650 MG/1
650 SUPPOSITORY RECTAL EVERY 4 HOURS PRN
Status: DISCONTINUED | OUTPATIENT
Start: 2025-07-21 | End: 2025-07-23 | Stop reason: HOSPADM

## 2025-07-21 RX ORDER — CARVEDILOL 12.5 MG/1
12.5 TABLET ORAL
Status: DISCONTINUED | OUTPATIENT
Start: 2025-07-21 | End: 2025-07-23 | Stop reason: HOSPADM

## 2025-07-21 RX ORDER — ACETAMINOPHEN 160 MG/5ML
650 SOLUTION ORAL EVERY 4 HOURS PRN
Status: DISCONTINUED | OUTPATIENT
Start: 2025-07-21 | End: 2025-07-23 | Stop reason: HOSPADM

## 2025-07-21 RX ORDER — NITROGLYCERIN 20 MG/G
0.5 OINTMENT TOPICAL ONCE
Status: DISCONTINUED | OUTPATIENT
Start: 2025-07-21 | End: 2025-07-23 | Stop reason: HOSPADM

## 2025-07-21 RX ORDER — DEXTROSE 50 % IN WATER (D50W) INTRAVENOUS SYRINGE
12.5
Status: DISCONTINUED | OUTPATIENT
Start: 2025-07-21 | End: 2025-07-23 | Stop reason: HOSPADM

## 2025-07-21 RX ORDER — CEFTRIAXONE 1 G/50ML
1 INJECTION, SOLUTION INTRAVENOUS EVERY 24 HOURS
Status: DISCONTINUED | OUTPATIENT
Start: 2025-07-21 | End: 2025-07-23 | Stop reason: HOSPADM

## 2025-07-21 RX ORDER — MAGNESIUM SULFATE HEPTAHYDRATE 40 MG/ML
2 INJECTION, SOLUTION INTRAVENOUS ONCE
Status: COMPLETED | OUTPATIENT
Start: 2025-07-21 | End: 2025-07-21

## 2025-07-21 RX ORDER — ACETAMINOPHEN 325 MG/1
650 TABLET ORAL EVERY 4 HOURS PRN
Status: DISCONTINUED | OUTPATIENT
Start: 2025-07-21 | End: 2025-07-23 | Stop reason: HOSPADM

## 2025-07-21 RX ORDER — AMLODIPINE BESYLATE 5 MG/1
5 TABLET ORAL DAILY
Status: DISCONTINUED | OUTPATIENT
Start: 2025-07-21 | End: 2025-07-23 | Stop reason: HOSPADM

## 2025-07-21 RX ORDER — GUAIFENESIN 600 MG/1
600 TABLET, EXTENDED RELEASE ORAL 2 TIMES DAILY
Status: DISCONTINUED | OUTPATIENT
Start: 2025-07-21 | End: 2025-07-23 | Stop reason: HOSPADM

## 2025-07-21 RX ORDER — INSULIN LISPRO 100 [IU]/ML
0-10 INJECTION, SOLUTION INTRAVENOUS; SUBCUTANEOUS
Status: DISCONTINUED | OUTPATIENT
Start: 2025-07-21 | End: 2025-07-22

## 2025-07-21 RX ORDER — POLYETHYLENE GLYCOL 3350 17 G/17G
17 POWDER, FOR SOLUTION ORAL DAILY
Status: DISCONTINUED | OUTPATIENT
Start: 2025-07-21 | End: 2025-07-23 | Stop reason: HOSPADM

## 2025-07-21 RX ORDER — PANTOPRAZOLE SODIUM 40 MG/1
40 TABLET, DELAYED RELEASE ORAL DAILY
Status: DISCONTINUED | OUTPATIENT
Start: 2025-07-21 | End: 2025-07-23 | Stop reason: HOSPADM

## 2025-07-21 RX ORDER — FUROSEMIDE 10 MG/ML
40 INJECTION INTRAMUSCULAR; INTRAVENOUS ONCE
Status: COMPLETED | OUTPATIENT
Start: 2025-07-21 | End: 2025-07-21

## 2025-07-21 RX ORDER — ONDANSETRON 4 MG/1
4 TABLET, FILM COATED ORAL EVERY 8 HOURS PRN
Status: DISCONTINUED | OUTPATIENT
Start: 2025-07-21 | End: 2025-07-23 | Stop reason: HOSPADM

## 2025-07-21 RX ORDER — EMPAGLIFLOZIN 25 MG/1
25 TABLET, FILM COATED ORAL
COMMUNITY

## 2025-07-21 RX ORDER — ROSUVASTATIN CALCIUM 20 MG/1
20 TABLET, COATED ORAL DAILY
Status: DISCONTINUED | OUTPATIENT
Start: 2025-07-21 | End: 2025-07-23 | Stop reason: HOSPADM

## 2025-07-21 RX ORDER — IPRATROPIUM BROMIDE AND ALBUTEROL SULFATE 2.5; .5 MG/3ML; MG/3ML
3 SOLUTION RESPIRATORY (INHALATION) EVERY 20 MIN
Status: COMPLETED | OUTPATIENT
Start: 2025-07-21 | End: 2025-07-21

## 2025-07-21 RX ORDER — DEXTROSE 50 % IN WATER (D50W) INTRAVENOUS SYRINGE
25
Status: DISCONTINUED | OUTPATIENT
Start: 2025-07-21 | End: 2025-07-23 | Stop reason: HOSPADM

## 2025-07-21 RX ADMIN — AMLODIPINE BESYLATE 5 MG: 5 TABLET ORAL at 16:28

## 2025-07-21 RX ADMIN — VALSARTAN: 160 TABLET, FILM COATED ORAL at 16:43

## 2025-07-21 RX ADMIN — ROSUVASTATIN CALCIUM 20 MG: 20 TABLET, FILM COATED ORAL at 16:28

## 2025-07-21 RX ADMIN — IPRATROPIUM BROMIDE AND ALBUTEROL SULFATE 3 ML: .5; 3 SOLUTION RESPIRATORY (INHALATION) at 10:05

## 2025-07-21 RX ADMIN — INSULIN LISPRO 6 UNITS: 100 INJECTION, SOLUTION INTRAVENOUS; SUBCUTANEOUS at 16:35

## 2025-07-21 RX ADMIN — GUAIFENESIN 600 MG: 600 TABLET, EXTENDED RELEASE ORAL at 21:16

## 2025-07-21 RX ADMIN — IPRATROPIUM BROMIDE AND ALBUTEROL SULFATE 3 ML: .5; 3 SOLUTION RESPIRATORY (INHALATION) at 10:09

## 2025-07-21 RX ADMIN — Medication 100 PERCENT: at 08:20

## 2025-07-21 RX ADMIN — DOXYCYCLINE 100 MG: 100 INJECTION, POWDER, LYOPHILIZED, FOR SOLUTION INTRAVENOUS at 10:14

## 2025-07-21 RX ADMIN — IPRATROPIUM BROMIDE AND ALBUTEROL SULFATE 3 ML: .5; 3 SOLUTION RESPIRATORY (INHALATION) at 08:55

## 2025-07-21 RX ADMIN — IPRATROPIUM BROMIDE AND ALBUTEROL SULFATE 3 ML: .5; 3 SOLUTION RESPIRATORY (INHALATION) at 08:57

## 2025-07-21 RX ADMIN — MAGNESIUM SULFATE HEPTAHYDRATE 2 G: 40 INJECTION, SOLUTION INTRAVENOUS at 08:39

## 2025-07-21 RX ADMIN — AZITHROMYCIN MONOHYDRATE 500 MG: 500 INJECTION, POWDER, LYOPHILIZED, FOR SOLUTION INTRAVENOUS at 16:40

## 2025-07-21 RX ADMIN — IPRATROPIUM BROMIDE AND ALBUTEROL SULFATE 3 ML: .5; 3 SOLUTION RESPIRATORY (INHALATION) at 10:13

## 2025-07-21 RX ADMIN — PANTOPRAZOLE SODIUM 40 MG: 40 TABLET, DELAYED RELEASE ORAL at 16:28

## 2025-07-21 RX ADMIN — IPRATROPIUM BROMIDE AND ALBUTEROL SULFATE 3 ML: .5; 3 SOLUTION RESPIRATORY (INHALATION) at 08:48

## 2025-07-21 RX ADMIN — IPRATROPIUM BROMIDE AND ALBUTEROL SULFATE 3 ML: 2.5; .5 SOLUTION RESPIRATORY (INHALATION) at 19:47

## 2025-07-21 RX ADMIN — FUROSEMIDE 40 MG: 10 INJECTION, SOLUTION INTRAMUSCULAR; INTRAVENOUS at 10:14

## 2025-07-21 RX ADMIN — Medication 3 L/MIN: at 19:47

## 2025-07-21 RX ADMIN — CARVEDILOL 12.5 MG: 12.5 TABLET, FILM COATED ORAL at 16:27

## 2025-07-21 RX ADMIN — IOHEXOL 75 ML: 350 INJECTION, SOLUTION INTRAVENOUS at 11:12

## 2025-07-21 RX ADMIN — ENOXAPARIN SODIUM 40 MG: 100 INJECTION SUBCUTANEOUS at 16:27

## 2025-07-21 RX ADMIN — METHYLPREDNISOLONE SODIUM SUCCINATE 40 MG: 40 INJECTION, POWDER, LYOPHILIZED, FOR SOLUTION INTRAMUSCULAR; INTRAVENOUS at 16:28

## 2025-07-21 RX ADMIN — CEFTRIAXONE 1 G: 1 INJECTION, SOLUTION INTRAVENOUS at 16:29

## 2025-07-21 RX ADMIN — IPRATROPIUM BROMIDE AND ALBUTEROL SULFATE 3 ML: 2.5; .5 SOLUTION RESPIRATORY (INHALATION) at 15:32

## 2025-07-21 SDOH — SOCIAL STABILITY: SOCIAL INSECURITY: DO YOU FEEL ANYONE HAS EXPLOITED OR TAKEN ADVANTAGE OF YOU FINANCIALLY OR OF YOUR PERSONAL PROPERTY?: NO

## 2025-07-21 SDOH — ECONOMIC STABILITY: FOOD INSECURITY: WITHIN THE PAST 12 MONTHS, THE FOOD YOU BOUGHT JUST DIDN'T LAST AND YOU DIDN'T HAVE MONEY TO GET MORE.: NEVER TRUE

## 2025-07-21 SDOH — ECONOMIC STABILITY: HOUSING INSECURITY: IN THE LAST 12 MONTHS, WAS THERE A TIME WHEN YOU WERE NOT ABLE TO PAY THE MORTGAGE OR RENT ON TIME?: NO

## 2025-07-21 SDOH — SOCIAL STABILITY: SOCIAL INSECURITY
WITHIN THE LAST YEAR, HAVE YOU BEEN RAPED OR FORCED TO HAVE ANY KIND OF SEXUAL ACTIVITY BY YOUR PARTNER OR EX-PARTNER?: NO

## 2025-07-21 SDOH — SOCIAL STABILITY: SOCIAL INSECURITY
WITHIN THE LAST YEAR, HAVE YOU BEEN KICKED, HIT, SLAPPED, OR OTHERWISE PHYSICALLY HURT BY YOUR PARTNER OR EX-PARTNER?: NO

## 2025-07-21 SDOH — SOCIAL STABILITY: SOCIAL INSECURITY: ARE THERE ANY APPARENT SIGNS OF INJURIES/BEHAVIORS THAT COULD BE RELATED TO ABUSE/NEGLECT?: NO

## 2025-07-21 SDOH — ECONOMIC STABILITY: HOUSING INSECURITY: IN THE PAST 12 MONTHS, HOW MANY TIMES HAVE YOU MOVED WHERE YOU WERE LIVING?: 1

## 2025-07-21 SDOH — ECONOMIC STABILITY: FOOD INSECURITY: WITHIN THE PAST 12 MONTHS, YOU WORRIED THAT YOUR FOOD WOULD RUN OUT BEFORE YOU GOT THE MONEY TO BUY MORE.: NEVER TRUE

## 2025-07-21 SDOH — ECONOMIC STABILITY: INCOME INSECURITY: IN THE PAST 12 MONTHS HAS THE ELECTRIC, GAS, OIL, OR WATER COMPANY THREATENED TO SHUT OFF SERVICES IN YOUR HOME?: NO

## 2025-07-21 SDOH — ECONOMIC STABILITY: TRANSPORTATION INSECURITY: IN THE PAST 12 MONTHS, HAS LACK OF TRANSPORTATION KEPT YOU FROM MEDICAL APPOINTMENTS OR FROM GETTING MEDICATIONS?: NO

## 2025-07-21 SDOH — SOCIAL STABILITY: SOCIAL INSECURITY: HAVE YOU HAD THOUGHTS OF HARMING ANYONE ELSE?: NO

## 2025-07-21 SDOH — ECONOMIC STABILITY: HOUSING INSECURITY: AT ANY TIME IN THE PAST 12 MONTHS, WERE YOU HOMELESS OR LIVING IN A SHELTER (INCLUDING NOW)?: NO

## 2025-07-21 SDOH — SOCIAL STABILITY: SOCIAL INSECURITY: ABUSE: ADULT

## 2025-07-21 SDOH — SOCIAL STABILITY: SOCIAL INSECURITY: ARE YOU OR HAVE YOU BEEN THREATENED OR ABUSED PHYSICALLY, EMOTIONALLY, OR SEXUALLY BY ANYONE?: NO

## 2025-07-21 SDOH — ECONOMIC STABILITY: FOOD INSECURITY: HOW HARD IS IT FOR YOU TO PAY FOR THE VERY BASICS LIKE FOOD, HOUSING, MEDICAL CARE, AND HEATING?: NOT HARD AT ALL

## 2025-07-21 SDOH — SOCIAL STABILITY: SOCIAL INSECURITY: HAVE YOU HAD ANY THOUGHTS OF HARMING ANYONE ELSE?: NO

## 2025-07-21 SDOH — SOCIAL STABILITY: SOCIAL INSECURITY: WITHIN THE LAST YEAR, HAVE YOU BEEN HUMILIATED OR EMOTIONALLY ABUSED IN OTHER WAYS BY YOUR PARTNER OR EX-PARTNER?: NO

## 2025-07-21 SDOH — SOCIAL STABILITY: SOCIAL INSECURITY: WITHIN THE LAST YEAR, HAVE YOU BEEN AFRAID OF YOUR PARTNER OR EX-PARTNER?: NO

## 2025-07-21 SDOH — SOCIAL STABILITY: SOCIAL INSECURITY: WERE YOU ABLE TO COMPLETE ALL THE BEHAVIORAL HEALTH SCREENINGS?: YES

## 2025-07-21 SDOH — SOCIAL STABILITY: SOCIAL INSECURITY: DOES ANYONE TRY TO KEEP YOU FROM HAVING/CONTACTING OTHER FRIENDS OR DOING THINGS OUTSIDE YOUR HOME?: NO

## 2025-07-21 SDOH — SOCIAL STABILITY: SOCIAL INSECURITY: HAS ANYONE EVER THREATENED TO HURT YOUR FAMILY OR YOUR PETS?: NO

## 2025-07-21 SDOH — SOCIAL STABILITY: SOCIAL INSECURITY: DO YOU FEEL UNSAFE GOING BACK TO THE PLACE WHERE YOU ARE LIVING?: NO

## 2025-07-21 ASSESSMENT — ACTIVITIES OF DAILY LIVING (ADL)
JUDGMENT_ADEQUATE_SAFELY_COMPLETE_DAILY_ACTIVITIES: YES
JUDGMENT_ADEQUATE_SAFELY_COMPLETE_DAILY_ACTIVITIES: YES
FEEDING YOURSELF: INDEPENDENT
DRESSING YOURSELF: INDEPENDENT
TOILETING: INDEPENDENT
PATIENT'S MEMORY ADEQUATE TO SAFELY COMPLETE DAILY ACTIVITIES?: YES
HEARING - LEFT EAR: FUNCTIONAL
DRESSING YOURSELF: INDEPENDENT
BATHING: INDEPENDENT
GROOMING: INDEPENDENT
HEARING - RIGHT EAR: FUNCTIONAL
LACK_OF_TRANSPORTATION: NO
LACK_OF_TRANSPORTATION: NO
FEEDING YOURSELF: INDEPENDENT
ASSISTIVE_DEVICE: CANE;EYEGLASSES
HEARING - LEFT EAR: FUNCTIONAL
ADEQUATE_TO_COMPLETE_ADL: YES
GROOMING: INDEPENDENT
WALKS IN HOME: INDEPENDENT
ADEQUATE_TO_COMPLETE_ADL: YES
WALKS IN HOME: INDEPENDENT
HEARING - RIGHT EAR: FUNCTIONAL
TOILETING: INDEPENDENT
PATIENT'S MEMORY ADEQUATE TO SAFELY COMPLETE DAILY ACTIVITIES?: YES
ASSISTIVE_DEVICE: CANE;EYEGLASSES

## 2025-07-21 ASSESSMENT — PAIN SCALES - GENERAL
PAINLEVEL_OUTOF10: 0 - NO PAIN

## 2025-07-21 ASSESSMENT — COGNITIVE AND FUNCTIONAL STATUS - GENERAL
MOBILITY SCORE: 24
PATIENT BASELINE BEDBOUND: NO
DAILY ACTIVITIY SCORE: 24
DAILY ACTIVITIY SCORE: 24
MOBILITY SCORE: 24

## 2025-07-21 ASSESSMENT — LIFESTYLE VARIABLES
HOW OFTEN DO YOU HAVE A DRINK CONTAINING ALCOHOL: MONTHLY OR LESS
SKIP TO QUESTIONS 9-10: 0
AUDIT-C TOTAL SCORE: 3
AUDIT-C TOTAL SCORE: 3
HOW OFTEN DO YOU HAVE 6 OR MORE DRINKS ON ONE OCCASION: MONTHLY
HAVE PEOPLE ANNOYED YOU BY CRITICIZING YOUR DRINKING: NO
HOW MANY STANDARD DRINKS CONTAINING ALCOHOL DO YOU HAVE ON A TYPICAL DAY: 1 OR 2
EVER HAD A DRINK FIRST THING IN THE MORNING TO STEADY YOUR NERVES TO GET RID OF A HANGOVER: NO
TOTAL SCORE: 0
HAVE YOU EVER FELT YOU SHOULD CUT DOWN ON YOUR DRINKING: NO
EVER FELT BAD OR GUILTY ABOUT YOUR DRINKING: NO

## 2025-07-21 ASSESSMENT — PATIENT HEALTH QUESTIONNAIRE - PHQ9
SUM OF ALL RESPONSES TO PHQ9 QUESTIONS 1 & 2: 0
2. FEELING DOWN, DEPRESSED OR HOPELESS: NOT AT ALL
1. LITTLE INTEREST OR PLEASURE IN DOING THINGS: NOT AT ALL

## 2025-07-21 ASSESSMENT — PAIN - FUNCTIONAL ASSESSMENT
PAIN_FUNCTIONAL_ASSESSMENT: 0-10
PAIN_FUNCTIONAL_ASSESSMENT: 0-10

## 2025-07-21 NOTE — ED PROVIDER NOTES
HPI   Chief Complaint   Patient presents with    Shortness of Breath     SOB since this morning. EMS gave 125 solumed, 1 duoneb and placed pt on cpap prior to arrival to ED. Pt denies CP or any other symptoms.          History provided by:  Patient and EMS personnel    Chief Complaint   Patient presents with    Shortness of Breath     SOB since this morning. EMS gave 125 solumed, 1 duoneb and placed pt on cpap prior to arrival to ED. Pt denies CP or any other symptoms.        History of Present Illness:  Adolfo Sanford is a 64 y.o. male presents with shortness of breath since 6 AM this morning.  No fever or chills.  Mild cough.  No chest pain.  No back or flank pain.  No lightheadedness or dizziness.  No leg swelling.  EMS administered a DuoNeb and Solu-Medrol.  Just prior to arrival he had increased work of breathing so they placed him on CPAP.  No trauma or travel.  No sick contacts.      PMFSH:   As per HPI, otherwise nurses notes reviewed in EMR.    Past Medical History: Medical History[1]   Past Surgical History: Surgical History[2]   Family History: Family History[3]   Social History:  Social History[4]  Allergies: Allergies[5]  Current Outpatient Medications   Medication Instructions    carvedilol (COREG) 12.5 mg, oral, 2 times daily (morning and late afternoon)    Farxiga 5 mg, oral, Daily before breakfast    felodipine ER (PLENDIL) 5 mg, oral, Daily, Do not crush, chew, or split.    glipiZIDE (GLUCOTROL) 10 mg, oral, 2 times daily before meals    meloxicam (MOBIC) 15 mg, oral, Daily    metFORMIN (GLUCOPHAGE) 1,000 mg, oral, 2 times daily    Ozempic 0.25 mg, subcutaneous, Once Weekly    pantoprazole (PROTONIX) 40 mg, oral, Daily    rosuvastatin (CRESTOR) 20 mg, oral, Daily    telmisartan-hydrochlorothiazid (MIcarDIS HCT) 80-25 mg tablet 1 tablet, oral, Daily    Trelegy Ellipta 100-62.5-25 mcg blister with device 1 puff, inhalation, Daily RT    Tresiba FlexTouch U-200 50 Units, subcutaneous, 2 times daily               Patient History   Medical History[6]  Surgical History[7]  Family History[8]  Social History[9]    Physical Exam   ED Triage Vitals   Temp Pulse Resp BP   -- -- -- --      SpO2 Temp src Heart Rate Source Patient Position   -- -- -- --      BP Location FiO2 (%)     -- --       Physical Exam  Physical Exam:    ED Triage Vitals [07/21/25 0813]   Temp Heart Rate Respirations BP   -- (!) 128 (!) 40 --      Pulse Ox Temp src Heart Rate Source Patient Position   (!) 90 % -- -- Sitting      BP Location FiO2 (%)     Left arm --         Patient Vitals for the past 24 hrs:   BP Temp Temp src Pulse Resp SpO2 Height Weight   07/21/25 1200 -- -- -- 88 -- 95 % -- --   07/21/25 1145 -- -- -- 86 -- 95 % -- --   07/21/25 1140 140/80 36 °C (96.8 °F) Temporal 90 (!) 24 95 % -- --   07/21/25 1130 137/83 -- -- 92 -- (!) 93 % -- --   07/21/25 1115 -- -- -- -- -- 95 % -- --   07/21/25 1100 -- -- -- 100 -- 96 % -- --   07/21/25 1045 -- -- -- (!) 136 -- 96 % -- --   07/21/25 1030 -- -- -- (!) 138 -- 96 % -- --   07/21/25 1015 -- -- -- 94 -- 96 % -- --   07/21/25 1010 113/68 -- -- 96 -- 96 % -- --   07/21/25 1009 -- -- -- -- -- 95 % -- --   07/21/25 1000 109/70 -- -- 96 -- 96 % -- --   07/21/25 0945 -- -- -- 98 -- 97 % -- --   07/21/25 0940 105/59 -- -- 98 -- 98 % -- --   07/21/25 0930 -- -- -- 100 -- 99 % -- --   07/21/25 0915 -- -- -- (!) 104 -- 98 % -- --   07/21/25 0910 117/63 -- -- (!) 104 -- 98 % -- --   07/21/25 0901 -- -- -- -- -- 99 % -- --   07/21/25 0900 -- -- -- (!) 104 -- 99 % -- --   07/21/25 0845 -- -- -- (!) 112 -- 100 % -- --   07/21/25 0840 (!) 151/96 -- -- (!) 116 (!) 28 99 % -- --   07/21/25 0835 (!) 159/107 -- -- (!) 116 -- 99 % -- --   07/21/25 0830 -- -- -- -- -- 99 % -- --   07/21/25 0828 -- -- -- -- -- 99 % -- --   07/21/25 0820 -- -- -- -- (!) 41 100 % -- --   07/21/25 0815 (!) 262/152 -- -- -- -- (!) 93 % -- --   07/21/25 0813 (!) 159/112 -- -- (!) 128 (!) 40 99 % 1.829 m (6') 132 kg (290 lb)        Constitutional: Vital signs per nursing notes.  Well developed, well nourished.  Moderate acute distress.    Psychiatric: alert and oriented to person, place, and time; no abnormalities of mood or affect; memory intact  Eyes: PERRL; conjunctivae and lids normal; EOMI  ENT: otoscopic exam of external canal and TM´s normal; nasal mucosa, turbinates, and septum normal; mouth, tongue, and pharynx normal; pharynx without edema, exudate, or injection  Respiratory: Increased respiratory effort and excursion; no rales, rhonchi, or wheezes; equal but diminished air entry  Cardiovascular: Tachycardic, regular rate and rhythm; no murmurs, rubs or gallops; symmetric pulses; no edema; normal capillary refill; distal pulses present  Neurological: normal speech; CN II-XII grossly intact; normal motor and sensory function; no nystagmus  GI: no masses, tenderness, rebound or guarding; no palpable, pulsatile mass; no organomegaly; no hernia; normal bowel sounds; (-) Ramirez´s sign; (-) McBurney´s sign; (-) CVA tenderness  Lymphatic: no adenopathy of neck  Musculoskeletal: normal digits and nails; no gross tendon or ligament injury; normal to palpation; normal strength/tone; neurovascular status intact; (-) Sonia´s sign; (-) straight leg raise; no palpable cords; calf circumference equal bilaterally  Skin: normal to inspection; normal to palpation; no rash  GCS: 15      ED Course & MDM   Diagnoses as of 07/21/25 1350   Acute respiratory failure with hypoxia   Pneumonia of left lower lobe due to infectious organism   Elevated troponin                 No data recorded     Sebastian Coma Scale Score: 15 (07/21/25 0828 : Chela Miguel, RN)                           Medical Decision Making  Medical Decision Making:    EKG: My interpretation of EKG is sinus tachycardia 120 bpm with right bundle branch block and nonspecific ST-T changes              My interpretation of second EKG is normal sinus rhythm at 97 bpm with left axis  deviation and nonspecific ST-T changes             My interpretation of 30 EKG is normal sinus rhythm at 98 bpm with left axis deviation and nonspecific ST-T changes    Labs:   Labs Reviewed   CBC WITH AUTO DIFFERENTIAL - Abnormal       Result Value    WBC 8.5      nRBC 0.0      RBC 6.41 (*)     Hemoglobin 18.9 (*)     Hematocrit 56.4 (*)     MCV 88      MCH 29.5      MCHC 33.5      RDW 14.3      Platelets 233      Neutrophils % 51.9      Immature Granulocytes %, Automated 0.2      Lymphocytes % 33.1      Monocytes % 11.4      Eosinophils % 2.8      Basophils % 0.6      Neutrophils Absolute 4.42      Immature Granulocytes Absolute, Automated 0.02      Lymphocytes Absolute 2.82      Monocytes Absolute 0.97      Eosinophils Absolute 0.24      Basophils Absolute 0.05     COMPREHENSIVE METABOLIC PANEL - Abnormal    Glucose 224 (*)     Sodium 140      Potassium 3.6      Chloride 106      Bicarbonate 27      Anion Gap 11      Urea Nitrogen 12      Creatinine 0.70      eGFR >90      Calcium 9.1      Albumin 4.5      Alkaline Phosphatase 71      Total Protein 8.1      AST 17      Bilirubin, Total 1.0      ALT 14     B-TYPE NATRIURETIC PEPTIDE - Abnormal     (*)     Narrative:        <100 pg/mL - Heart failure unlikely  100-299 pg/mL - Intermediate probability of acute heart                  failure exacerbation. Correlate with clinical                  context and patient history.    >=300 pg/mL - Heart Failure likely. Correlate with clinical                  context and patient history.    BNP testing is performed using different testing methodology at [facility] than at other [facility] hospitals. Direct result comparisons should only be made within the same method.      LACTATE - Abnormal    Lactate 2.2 (*)     Narrative:     Venipuncture immediately after or during the administration of Metamizole may lead to falsely low results. Testing should be performed immediately prior to Metamizole dosing.    SERIAL TROPONIN-INITIAL - Abnormal    Troponin I, High Sensitivity 41 (*)     Narrative:     Less than 99th percentile of normal range cutoff-  Female and children under 18 years old <14 ng/L; Male <21 ng/L: Negative  Repeat testing should be performed if clinically indicated.     Female and children under 18 years old 14-50 ng/L; Male 21-50 ng/L:  Consistent with possible cardiac damage and possible increased clinical   risk. Serial measurements may help to assess extent of myocardial damage.     >50 ng/L: Consistent with cardiac damage, increased clinical risk and  myocardial infarction. Serial measurements may help assess extent of   myocardial damage.      NOTE: Children less than 1 year old may have higher baseline troponin   levels and results should be interpreted in conjunction with the overall   clinical context.     NOTE: Troponin I testing is performed using a different   testing methodology at [facility] than at other   [facility]. Direct result comparisons should only   be made within the same method.   BLOOD GAS ARTERIAL - Abnormal    POCT pH, Arterial 7.33 (*)     POCT pCO2, Arterial 52 (*)     POCT pO2, Arterial 325 (*)     POCT SO2, Arterial 100      POCT Oxy Hemoglobin, Arterial 98.3 (*)     POCT Base Excess, Arterial 0.6      POCT HCO3 Calculated, Arterial 27.4 (*)     Patient Temperature        FiO2 100      Apparatus FACE MASK      Ventilator Mode BiPAP      Ventilator Rate 18      Ipap CMH2O 20.0      Epap CMH2O 6.0      Site of Arterial Puncture Radial Right      Benson's Test Positive      Site of Arterial Puncture RRAD      Benson's Test POS     SERIAL TROPONIN, 1 HOUR - Abnormal    Troponin I, High Sensitivity 73 (*)     Narrative:     Less than 99th percentile of normal range cutoff-  Female and children under 18 years old <14 ng/L; Male <21 ng/L: Negative  Repeat testing should be performed if clinically indicated.     Female and children under 18 years old 14-50 ng/L;  Male 21-50 ng/L:  Consistent with possible cardiac damage and possible increased clinical   risk. Serial measurements may help to assess extent of myocardial damage.     >50 ng/L: Consistent with cardiac damage, increased clinical risk and  myocardial infarction. Serial measurements may help assess extent of   myocardial damage.      NOTE: Children less than 1 year old may have higher baseline troponin   levels and results should be interpreted in conjunction with the overall   clinical context.     NOTE: Troponin I testing is performed using a different   testing methodology at [facility] than at other   [facility]. Direct result comparisons should only   be made within the same method.   MAGNESIUM - Normal    Magnesium 1.84     PROTIME-INR - Normal    Protime 10.8      INR 1.0      Narrative:     ELYRIA-ALL SITES: A HEMATOCRIT VALUE GREATER THAN 55% MAY LEAD TO INACCURATE RESULTS IN COAGULATION TESTING. PATIENTS HAVING HEMATOCRIT VALUES >55% REQUIRE A SPECIAL COLLECTION TUBE FOR COAGULATION STUDIES. PLEASE CONTACT THE LABORATORY [phone] FOR INSTRUCTIONS.     LACTATE - Normal    Lactate 1.1      Narrative:     Venipuncture immediately after or during the administration of Metamizole may lead to falsely low results. Testing should be performed immediately prior to Metamizole dosing.   TROPONIN SERIES- (INITIAL, 1 HR)    Narrative:     The following orders were created for panel order Troponin I Series, High Sensitivity (0, 1 HR).  Procedure                               Abnormality         Status                     ---------                               -----------         ------                     Troponin I, High Sensiti...[[id number]]  Abnormal            Final result               Troponin, High Sensitivi...[[id number]]  Abnormal            Final result                 Please view results for these tests on the individual orders.       Diagnostic Imaging:   CT angio chest for  pulmonary embolism   Final Result   No evidence of pulmonary embolism or aortic dissection.   Bilateral groundglass pulmonary infiltrates with consolidation in the   left lung base and left pleural effusion.   Mediastinal and hilar adenopathy.  This is most likely reactive.   Signed by Adan Zelaya MD      XR chest 1 view   Final Result   Increased pulmonary vascularity. Hazy bibasilar opacities left greater   than right.  Correlate with infectious symptoms for possible   pneumonia.   Signed by David Wallis MD          ED Medication Administration:   Medications   oxygen (O2) therapy (has no administration in time range)   nitroglycerin (Bentley-Bid) 2 % ointment 0.5 inch (0.5 inches transdermal Not Given 7/21/25 1014)   magnesium sulfate 2 g in sterile water for injection 50 mL (0 g intravenous Stopped 7/21/25 0955)   ipratropium-albuteroL (Duo-Neb) 0.5-2.5 mg/3 mL nebulizer solution 3 mL (3 mL nebulization Given 7/21/25 0857)   furosemide (Lasix) injection 40 mg (40 mg intravenous Given 7/21/25 1014)   doxycycline (Vibramycin) 100 mg in dextrose 5%  mL (0 mg intravenous Stopped 7/21/25 1159)   ipratropium-albuteroL (Duo-Neb) 0.5-2.5 mg/3 mL nebulizer solution 3 mL (3 mL nebulization Given 7/21/25 1013)   iohexol (OMNIPaque) 350 mg iodine/mL solution 75 mL (75 mL intravenous Given 7/21/25 1112)       ED Course:     Adolfo Sanford is a 64 y.o. male presents with shortness of breath   .    Differential Diagnoses Considered:  ACS, arrhythmia, COPD exacerbation, CHF, pneumonia    Patient presents with shortness of breath.    Chest x-ray to evaluate for evidence of pneumonia/pneumothorax/CHF/COPD.     EKG/troponin to evaluate for evidence of arrhythmia/ACS.    Lab work to evaluate for evidence of anemia or significant electrolyte abnormality including hypokalemia, hyperkalemia, hyponatremia, hypernatremia, hyperglycemia or hypoglycemia.     Considered but did not perform emergent testing for  RSV/Influenza/COVID-19 at this time as it would not change medical management.    Lactic acid to evaluate for possibility of sepsis.    ABG to evaluate for evidence of hypoxia/CO2 retention/acidosis.    Considered CT chest, but no CT chest indicated as I considered but do not suspect aortic dissection/pulmonary embolus.          Diagnoses as of 07/21/25 1350   Acute respiratory failure with hypoxia   Pneumonia of left lower lobe due to infectious organism   Elevated troponin       Abnormal Labs Reviewed   CBC WITH AUTO DIFFERENTIAL - Abnormal; Notable for the following components:       Result Value    RBC 6.41 (*)     Hemoglobin 18.9 (*)     Hematocrit 56.4 (*)     All other components within normal limits   COMPREHENSIVE METABOLIC PANEL - Abnormal; Notable for the following components:    Glucose 224 (*)     All other components within normal limits   B-TYPE NATRIURETIC PEPTIDE - Abnormal; Notable for the following components:     (*)     All other components within normal limits    Narrative:        <100 pg/mL - Heart failure unlikely  100-299 pg/mL - Intermediate probability of acute heart                  failure exacerbation. Correlate with clinical                  context and patient history.    >=300 pg/mL - Heart Failure likely. Correlate with clinical                  context and patient history.    BNP testing is performed using different testing methodology at [facility] than at other [facility]. Direct result comparisons should only be made within the same method.      LACTATE - Abnormal; Notable for the following components:    Lactate 2.2 (*)     All other components within normal limits    Narrative:     Venipuncture immediately after or during the administration of Metamizole may lead to falsely low results. Testing should be performed immediately prior to Metamizole dosing.   SERIAL TROPONIN-INITIAL - Abnormal; Notable for the following components:    Troponin I, High  Sensitivity 41 (*)     All other components within normal limits    Narrative:     Less than 99th percentile of normal range cutoff-  Female and children under 18 years old <14 ng/L; Male <21 ng/L: Negative  Repeat testing should be performed if clinically indicated.     Female and children under 18 years old 14-50 ng/L; Male 21-50 ng/L:  Consistent with possible cardiac damage and possible increased clinical   risk. Serial measurements may help to assess extent of myocardial damage.     >50 ng/L: Consistent with cardiac damage, increased clinical risk and  myocardial infarction. Serial measurements may help assess extent of   myocardial damage.      NOTE: Children less than 1 year old may have higher baseline troponin   levels and results should be interpreted in conjunction with the overall   clinical context.     NOTE: Troponin I testing is performed using a different   testing methodology at [facility] than at other   [facility]. Direct result comparisons should only   be made within the same method.   BLOOD GAS ARTERIAL - Abnormal; Notable for the following components:    POCT pH, Arterial 7.33 (*)     POCT pCO2, Arterial 52 (*)     POCT pO2, Arterial 325 (*)     POCT Oxy Hemoglobin, Arterial 98.3 (*)     POCT HCO3 Calculated, Arterial 27.4 (*)     All other components within normal limits   SERIAL TROPONIN, 1 HOUR - Abnormal; Notable for the following components:    Troponin I, High Sensitivity 73 (*)     All other components within normal limits    Narrative:     Less than 99th percentile of normal range cutoff-  Female and children under 18 years old <14 ng/L; Male <21 ng/L: Negative  Repeat testing should be performed if clinically indicated.     Female and children under 18 years old 14-50 ng/L; Male 21-50 ng/L:  Consistent with possible cardiac damage and possible increased clinical   risk. Serial measurements may help to assess extent of myocardial damage.     >50 ng/L: Consistent  with cardiac damage, increased clinical risk and  myocardial infarction. Serial measurements may help assess extent of   myocardial damage.      NOTE: Children less than 1 year old may have higher baseline troponin   levels and results should be interpreted in conjunction with the overall   clinical context.     NOTE: Troponin I testing is performed using a different   testing methodology at [facility] than at other   [facility] hospitals. Direct result comparisons should only   be made within the same method.       BP      Temp      Pulse     Resp      SpO2            Diagnostic evaluation was completed.  INR is 1.0.  BNP is elevated at 478.  Troponin is elevated at 41.  Repeat troponin was elevated at 73.  CBC shows a normal white blood cell count with no evidence of anemia.  Platelets are in the normal range.  Metabolic panel shows an elevated glucose of 224.  Sodium and potassium in the normal range.  Renal and liver function are in the normal range.  ABG on BiPAP shows pH of 7.33 with PCO2 of 52 and PO2 of 325.  Lactate is elevated at 2.2.  Repeat lactate was downtrending and in the normal range at 1.1.  Chest x-ray shows increased pulmonary vascularity.  Hazy bibasilar opacities left greater than right.  CT of the chest shows no evidence of PE or aortic dissection.  Bilateral groundglass pulmonary infiltrates with consolidation in the left lung base and left pleural effusion.  Mediastinal and hilar adenopathy.    Re-evaluation: Repeat evaluation at 1:15 PM shows the patient is feeling much better.  He had initially been on BiPAP but was trialed off of it.  He is doing well without it at this point.  But does require 5 L of oxygen by nasal cannula.    Patient was treated with oxygen, IV magnesium, multiple DuoNebs, IV doxycycline, IV Lasix.    Medications administered improved the patient's condition.    I suspect the patient is likely suffering from acute respiratory failure secondary to pneumonia.   He will require hospitalization for further workup and evaluation.    I discussed the results and plan for hospitalization with the patient and/or family/friend if present.  Questions were addressed.  Patient and/or family/friend expressed understanding.    The patient's condition requires ongoing treatment and evaluation necessitating hospital admission.  I have reviewed the patient's history, physical exam, and test information with the admitting physician,   SABI Oswald/Dr. Vásquez                , who agrees to hospitalize the patient.     Shared decision making made with patient, and/or family, who agrees with plan.        Diagnosis:   1. Acute respiratory failure with hypoxia    2. Pneumonia of left lower lobe due to infectious organism    3. Elevated troponin              Procedure  Procedures         [1] No past medical history on file.  [2]   Past Surgical History:  Procedure Laterality Date    OTHER SURGICAL HISTORY  04/30/2019    Knee arthroscopy    OTHER SURGICAL HISTORY  09/18/2020    Joint replacement procedure   [3] No family history on file.  [4]   Social History  Tobacco Use    Smoking status: Never    Smokeless tobacco: Never   Vaping Use    Vaping status: Never Used   Substance Use Topics    Alcohol use: Not Currently    Drug use: Never   [5]   Allergies  Allergen Reactions    Liraglutide Other   [6] No past medical history on file.  [7]   Past Surgical History:  Procedure Laterality Date    OTHER SURGICAL HISTORY  04/30/2019    Knee arthroscopy    OTHER SURGICAL HISTORY  09/18/2020    Joint replacement procedure   [8] No family history on file.  [9]   Social History  Tobacco Use    Smoking status: Never    Smokeless tobacco: Never   Vaping Use    Vaping status: Never Used   Substance Use Topics    Alcohol use: Not Currently    Drug use: Never        Jose Luis NINO MD  07/21/25 5386

## 2025-07-21 NOTE — ED PROCEDURE NOTE
Procedure  Critical Care    Performed by: Jose Luis NINO MD  Authorized by: Jose Luis NINO MD    Critical care provider statement:     Critical care time (minutes):  76    Critical care time was exclusive of:  Separately billable procedures and treating other patients    Critical care was necessary to treat or prevent imminent or life-threatening deterioration of the following conditions:  Respiratory failure    Critical care was time spent personally by me on the following activities:  Blood draw for specimens, development of treatment plan with patient or surrogate, discussions with primary provider, evaluation of patient's response to treatment, examination of patient, obtaining history from patient or surrogate, ordering and performing treatments and interventions, ordering and review of laboratory studies, ordering and review of radiographic studies, pulse oximetry, re-evaluation of patient's condition and review of old charts    Care discussed with: admitting provider                 Jose Luis NINO MD  07/21/25 5824

## 2025-07-22 ENCOUNTER — APPOINTMENT (OUTPATIENT)
Dept: CARDIOLOGY | Facility: HOSPITAL | Age: 65
End: 2025-07-22
Payer: COMMERCIAL

## 2025-07-22 PROBLEM — J96.01 ACUTE RESPIRATORY FAILURE WITH HYPOXIA: Status: ACTIVE | Noted: 2025-07-22

## 2025-07-22 LAB
ANION GAP SERPL CALC-SCNC: 12 MMOL/L (ref 10–20)
BUN SERPL-MCNC: 14 MG/DL (ref 6–23)
CALCIUM SERPL-MCNC: 9 MG/DL (ref 8.6–10.3)
CARDIAC TROPONIN I PNL SERPL HS: 40 NG/L (ref 0–20)
CHLORIDE SERPL-SCNC: 104 MMOL/L (ref 98–107)
CO2 SERPL-SCNC: 27 MMOL/L (ref 21–32)
CREAT SERPL-MCNC: 0.66 MG/DL (ref 0.5–1.3)
EGFRCR SERPLBLD CKD-EPI 2021: >90 ML/MIN/1.73M*2
ERYTHROCYTE [DISTWIDTH] IN BLOOD BY AUTOMATED COUNT: 13.6 % (ref 11.5–14.5)
GLUCOSE BLD MANUAL STRIP-MCNC: 228 MG/DL (ref 74–99)
GLUCOSE BLD MANUAL STRIP-MCNC: 272 MG/DL (ref 74–99)
GLUCOSE BLD MANUAL STRIP-MCNC: 308 MG/DL (ref 74–99)
GLUCOSE BLD MANUAL STRIP-MCNC: 309 MG/DL (ref 74–99)
GLUCOSE SERPL-MCNC: 222 MG/DL (ref 74–99)
HCT VFR BLD AUTO: 47.2 % (ref 41–52)
HGB BLD-MCNC: 15.9 G/DL (ref 13.5–17.5)
HOLD SPECIMEN: NORMAL
MCH RBC QN AUTO: 29.4 PG (ref 26–34)
MCHC RBC AUTO-ENTMCNC: 33.7 G/DL (ref 32–36)
MCV RBC AUTO: 87 FL (ref 80–100)
NRBC BLD-RTO: 0 /100 WBCS (ref 0–0)
PLATELET # BLD AUTO: 212 X10*3/UL (ref 150–450)
POTASSIUM SERPL-SCNC: 3.8 MMOL/L (ref 3.5–5.3)
RBC # BLD AUTO: 5.41 X10*6/UL (ref 4.5–5.9)
SODIUM SERPL-SCNC: 139 MMOL/L (ref 136–145)
WBC # BLD AUTO: 15.4 X10*3/UL (ref 4.4–11.3)

## 2025-07-22 PROCEDURE — 36415 COLL VENOUS BLD VENIPUNCTURE: CPT

## 2025-07-22 PROCEDURE — 99232 SBSQ HOSP IP/OBS MODERATE 35: CPT

## 2025-07-22 PROCEDURE — 2500000004 HC RX 250 GENERAL PHARMACY W/ HCPCS (ALT 636 FOR OP/ED)

## 2025-07-22 PROCEDURE — 87040 BLOOD CULTURE FOR BACTERIA: CPT | Mod: ELYLAB

## 2025-07-22 PROCEDURE — 2500000002 HC RX 250 W HCPCS SELF ADMINISTERED DRUGS (ALT 637 FOR MEDICARE OP, ALT 636 FOR OP/ED)

## 2025-07-22 PROCEDURE — 94640 AIRWAY INHALATION TREATMENT: CPT

## 2025-07-22 PROCEDURE — 82947 ASSAY GLUCOSE BLOOD QUANT: CPT

## 2025-07-22 PROCEDURE — 2500000001 HC RX 250 WO HCPCS SELF ADMINISTERED DRUGS (ALT 637 FOR MEDICARE OP)

## 2025-07-22 PROCEDURE — 93005 ELECTROCARDIOGRAM TRACING: CPT

## 2025-07-22 PROCEDURE — 84484 ASSAY OF TROPONIN QUANT: CPT

## 2025-07-22 PROCEDURE — 80048 BASIC METABOLIC PNL TOTAL CA: CPT

## 2025-07-22 PROCEDURE — 85027 COMPLETE CBC AUTOMATED: CPT

## 2025-07-22 PROCEDURE — 1210000001 HC SEMI-PRIVATE ROOM DAILY

## 2025-07-22 PROCEDURE — 2500000005 HC RX 250 GENERAL PHARMACY W/O HCPCS

## 2025-07-22 RX ORDER — INSULIN GLARGINE 100 [IU]/ML
50 INJECTION, SOLUTION SUBCUTANEOUS NIGHTLY
Status: DISCONTINUED | OUTPATIENT
Start: 2025-07-22 | End: 2025-07-23 | Stop reason: HOSPADM

## 2025-07-22 RX ORDER — INSULIN LISPRO 100 [IU]/ML
0-15 INJECTION, SOLUTION INTRAVENOUS; SUBCUTANEOUS
Status: DISCONTINUED | OUTPATIENT
Start: 2025-07-22 | End: 2025-07-23 | Stop reason: HOSPADM

## 2025-07-22 RX ADMIN — INSULIN LISPRO 8 UNITS: 100 INJECTION, SOLUTION INTRAVENOUS; SUBCUTANEOUS at 16:25

## 2025-07-22 RX ADMIN — GUAIFENESIN 600 MG: 600 TABLET, EXTENDED RELEASE ORAL at 20:49

## 2025-07-22 RX ADMIN — IPRATROPIUM BROMIDE AND ALBUTEROL SULFATE 3 ML: 2.5; .5 SOLUTION RESPIRATORY (INHALATION) at 07:39

## 2025-07-22 RX ADMIN — GUAIFENESIN 600 MG: 600 TABLET, EXTENDED RELEASE ORAL at 08:15

## 2025-07-22 RX ADMIN — INSULIN LISPRO 4 UNITS: 100 INJECTION, SOLUTION INTRAVENOUS; SUBCUTANEOUS at 06:20

## 2025-07-22 RX ADMIN — CARVEDILOL 12.5 MG: 12.5 TABLET, FILM COATED ORAL at 08:15

## 2025-07-22 RX ADMIN — METHYLPREDNISOLONE SODIUM SUCCINATE 40 MG: 40 INJECTION, POWDER, LYOPHILIZED, FOR SOLUTION INTRAMUSCULAR; INTRAVENOUS at 03:29

## 2025-07-22 RX ADMIN — IPRATROPIUM BROMIDE AND ALBUTEROL SULFATE 3 ML: 2.5; .5 SOLUTION RESPIRATORY (INHALATION) at 00:51

## 2025-07-22 RX ADMIN — VALSARTAN: 160 TABLET, FILM COATED ORAL at 08:15

## 2025-07-22 RX ADMIN — ROSUVASTATIN CALCIUM 20 MG: 20 TABLET, FILM COATED ORAL at 08:15

## 2025-07-22 RX ADMIN — INSULIN GLARGINE 50 UNITS: 100 INJECTION, SOLUTION SUBCUTANEOUS at 20:49

## 2025-07-22 RX ADMIN — INSULIN LISPRO 6 UNITS: 100 INJECTION, SOLUTION INTRAVENOUS; SUBCUTANEOUS at 11:14

## 2025-07-22 RX ADMIN — IPRATROPIUM BROMIDE AND ALBUTEROL SULFATE 3 ML: 2.5; .5 SOLUTION RESPIRATORY (INHALATION) at 12:56

## 2025-07-22 RX ADMIN — Medication 2 L/MIN: at 07:41

## 2025-07-22 RX ADMIN — AMLODIPINE BESYLATE 5 MG: 5 TABLET ORAL at 08:15

## 2025-07-22 RX ADMIN — CEFTRIAXONE 1 G: 1 INJECTION, SOLUTION INTRAVENOUS at 17:59

## 2025-07-22 RX ADMIN — CARVEDILOL 12.5 MG: 12.5 TABLET, FILM COATED ORAL at 16:18

## 2025-07-22 RX ADMIN — IPRATROPIUM BROMIDE AND ALBUTEROL SULFATE 3 ML: 2.5; .5 SOLUTION RESPIRATORY (INHALATION) at 18:01

## 2025-07-22 RX ADMIN — PANTOPRAZOLE SODIUM 40 MG: 40 TABLET, DELAYED RELEASE ORAL at 08:15

## 2025-07-22 RX ADMIN — AZITHROMYCIN MONOHYDRATE 500 MG: 500 INJECTION, POWDER, LYOPHILIZED, FOR SOLUTION INTRAVENOUS at 16:25

## 2025-07-22 RX ADMIN — METHYLPREDNISOLONE SODIUM SUCCINATE 40 MG: 40 INJECTION, POWDER, LYOPHILIZED, FOR SOLUTION INTRAMUSCULAR; INTRAVENOUS at 16:18

## 2025-07-22 RX ADMIN — ENOXAPARIN SODIUM 40 MG: 100 INJECTION SUBCUTANEOUS at 16:18

## 2025-07-22 ASSESSMENT — COGNITIVE AND FUNCTIONAL STATUS - GENERAL
MOBILITY SCORE: 24
DAILY ACTIVITIY SCORE: 24

## 2025-07-22 ASSESSMENT — PAIN SCALES - GENERAL
PAINLEVEL_OUTOF10: 0 - NO PAIN
PAINLEVEL_OUTOF10: 0 - NO PAIN

## 2025-07-22 ASSESSMENT — ACTIVITIES OF DAILY LIVING (ADL): LACK_OF_TRANSPORTATION: NO

## 2025-07-22 ASSESSMENT — PAIN - FUNCTIONAL ASSESSMENT: PAIN_FUNCTIONAL_ASSESSMENT: 0-10

## 2025-07-22 NOTE — CARE PLAN
The patient's goals for the shift include      The clinical goals for the shift include patient will maintain SpO2 WNL on 3L nc.

## 2025-07-22 NOTE — PROGRESS NOTES
"Daily Progress Note    Adolfo Sanford is a 64 y.o. male on day 0 of admission presenting with Pneumonia due to gram-negative bacteria (Multi).    Subjective   Patient seen and examined, resting comfortably in bed.  Currently wearing 2 L O2 via NC.  Endorses productive cough and chest tightness.  Denies CP, SOB, nausea, vomiting, diarrhea.  Will continue Rocephin and azithromycin for now.       Objective   Physical Exam  Constitutional:       Appearance: Normal appearance. He is morbidly obese.      Interventions: Nasal cannula in place.      Comments: 2 L   HENT:      Head: Normocephalic.      Mouth/Throat:      Mouth: Mucous membranes are moist.     Eyes:      Pupils: Pupils are equal, round, and reactive to light.       Cardiovascular:      Rate and Rhythm: Normal rate and regular rhythm.      Heart sounds: Normal heart sounds, S1 normal and S2 normal.   Pulmonary:      Effort: Pulmonary effort is normal.      Breath sounds: Decreased breath sounds present.   Abdominal:      General: Bowel sounds are normal.      Palpations: Abdomen is soft.      Tenderness: There is no abdominal tenderness.     Musculoskeletal:         General: Normal range of motion.      Cervical back: Neck supple.      Right lower leg: No edema.      Left lower leg: No edema.     Skin:     General: Skin is warm.     Neurological:      Mental Status: He is alert and oriented to person, place, and time.     Psychiatric:         Mood and Affect: Mood normal.         Behavior: Behavior normal.         Last Recorded Vitals  Blood pressure 131/77, pulse 100, temperature 35.9 °C (96.6 °F), resp. rate 18, height 1.829 m (6' 0.01\"), weight 131 kg (288 lb 2.3 oz), SpO2 96%.  Intake/Output last 3 Shifts:  I/O last 3 completed shifts:  In: 400 (3.1 mL/kg) [I.V.:50 (0.4 mL/kg); IV Piggyback:350]  Out: 1650 (12.6 mL/kg) [Urine:1650 (0.4 mL/kg/hr)]  Weight: 130.7 kg     Medications  Scheduled medications  Scheduled Medications[1]  Continuous " medications  Continuous Medications[2]  PRN medications  PRN Medications[3]    Labs  CBC:   Results from last 7 days   Lab Units 07/22/25  0647 07/21/25  0826   WBC AUTO x10*3/uL 15.4* 8.5   RBC AUTO x10*6/uL 5.41 6.41*   HEMOGLOBIN g/dL 15.9 18.9*   HEMATOCRIT % 47.2 56.4*   MCV fL 87 88   MCH pg 29.4 29.5   MCHC g/dL 33.7 33.5   RDW % 13.6 14.3   PLATELETS AUTO x10*3/uL 212 233     CMP:    Results from last 7 days   Lab Units 07/22/25  0647 07/21/25  0826   SODIUM mmol/L 139 140   POTASSIUM mmol/L 3.8 3.6   CHLORIDE mmol/L 104 106   CO2 mmol/L 27 27   BUN mg/dL 14 12   CREATININE mg/dL 0.66 0.70   GLUCOSE mg/dL 222* 224*   PROTEIN TOTAL g/dL  --  8.1   CALCIUM mg/dL 9.0 9.1   BILIRUBIN TOTAL mg/dL  --  1.0   ALK PHOS U/L  --  71   AST U/L  --  17   ALT U/L  --  14     BMP:    Results from last 7 days   Lab Units 07/22/25  0647 07/21/25  0826   SODIUM mmol/L 139 140   POTASSIUM mmol/L 3.8 3.6   CHLORIDE mmol/L 104 106   CO2 mmol/L 27 27   BUN mg/dL 14 12   CREATININE mg/dL 0.66 0.70   CALCIUM mg/dL 9.0 9.1   GLUCOSE mg/dL 222* 224*     Magnesium:  Results from last 7 days   Lab Units 07/21/25  0826   MAGNESIUM mg/dL 1.84     Troponin:    Results from last 7 days   Lab Units 07/21/25  0858 07/21/25  0826   TROPHS ng/L 73* 41*     BNP:   Results from last 7 days   Lab Units 07/21/25  0826   BNP pg/mL 478*     Lipid Panel:  Results from last 7 days   Lab Units 07/21/25  0826   INR  1.0   PROTIME seconds 10.8        Nutrition             Relevant Results  Results from last 7 days   Lab Units 07/22/25  0647 07/22/25  0613 07/21/25  1932 07/21/25  1605 07/21/25  0826   POCT GLUCOSE mg/dL  --  228* 398* 261*  --    GLUCOSE mg/dL 222*  --   --   --  224*     Lab Results   Component Value Date    HGBA1C 10.3 (H) 03/03/2025        Assessment/Plan    Acute respiratory failure with hypoxia  CAP most likely gram-negative  - CXR shows hazy bibasilar opacities in the left greater than right   -Solu-Medrol, Mucinex and DuoNeb  -  Continue ceftriaxone an Azithromycin  - Supplemental O2 to keep SpO2 >92%, wean as tolerated  - PT/OT evaluation    Elevated troponin  - Troponin 40/73, will repeat today  - Likely secondary to demand ischemia in the setting of acute hypoxia  - EKGs without significant changes    T2DM requiring insulin  -A1c 3/2025 was 10.3%  -Repeat A1c  -Hold home antihyperglycemics  - SSI, Accu-Cheks, diabetic diet  - Will start Lantus 50 units nightly (takes Tresiba 72 units twice daily at home)    HTN/HLD  GERD  Morbid obesity  - Continue home meds as appropriate  - PPI daily  - BMI 39.07, complicating overall disease process  - Encouraged lifestyle changes    DVTp: Lovenox  PLAN: Anticipate home    Priya Billingsley PA-C    Plan of care was discussed extensively with patient.  Patient verbalized understanding through teach back method.  All question and concerns addressed upon examination.    Of note, this documentation is completed using the Dragon Dictation system (voice recognition software). There may be spelling and/or grammatical errors that were not corrected prior to final submission.             [1] amLODIPine, 5 mg, oral, Daily  azithromycin, 500 mg, intravenous, q24h  carvedilol, 12.5 mg, oral, BID  cefTRIAXone, 1 g, intravenous, q24h  enoxaparin, 40 mg, subcutaneous, q24h  guaiFENesin, 600 mg, oral, BID  insulin lispro, 0-10 Units, subcutaneous, TID AC  ipratropium-albuteroL, 3 mL, nebulization, q6h  methylPREDNISolone sodium succinate (PF), 40 mg, intravenous, q12h  nitroglycerin, 0.5 inch, transdermal, Once  oxygen, , inhalation, Continuous - Inhalation  pantoprazole, 40 mg, oral, Daily  polyethylene glycol, 17 g, oral, Daily  rosuvastatin, 20 mg, oral, Daily  valsartan 160 mg, hydroCHLOROthiazide 25 mg for Diovan HCT, , oral, Daily  [2]    [3] PRN medications: acetaminophen **OR** acetaminophen **OR** acetaminophen, dextrose, dextrose, glucagon, glucagon, ondansetron **OR** ondansetron

## 2025-07-22 NOTE — CARE PLAN
The patient's goals for the shift include      The clinical goals for the shift include pt will report improvement in respiratory status

## 2025-07-22 NOTE — PROGRESS NOTES
07/22/25 1346   Discharge Planning   Living Arrangements Spouse/significant other   Support Systems Spouse/significant other;Family members   Assistance Needed none, PTA independent ADLS and IADLS no AD, drives, works Full-time, denies falls   Type of Residence Private residence  (1 level home + basement (laundry), Bedroom/full bathroom 1st floor)   Number of Stairs to Enter Residence 0   Number of Stairs Within Residence 13   Do you have animals or pets at home? Yes   Type of Animals or Pets 1 Dog   Home or Post Acute Services None   Expected Discharge Disposition Home   Financial Resource Strain   How hard is it for you to pay for the very basics like food, housing, medical care, and heating? Not hard   Housing Stability   In the last 12 months, was there a time when you were not able to pay the mortgage or rent on time? N   In the past 12 months, how many times have you moved where you were living? 0   At any time in the past 12 months, were you homeless or living in a shelter (including now)? N   Transportation Needs   In the past 12 months, has lack of transportation kept you from medical appointments or from getting medications? no   In the past 12 months, has lack of transportation kept you from meetings, work, or from getting things needed for daily living? No   Stroke Family Assessment   Stroke Family Assessment Needed No   Intensity of Service   Intensity of Service 0-30 min     Pt admitted with DX Pneumonia due to gram-negative bacteria, currently receiving IV antibiotics and steroids, on supplemental oxygen 2 LPM nasal cannula. Pt from home, completely independent, works full-time, drives, denies falls. Pt PCP is Dr. Carrion, Pharmacy, [facility] in [place], denies barriers. Pt discharge preference is home. CT team will monitor case for progression and potential discharge needs.

## 2025-07-23 VITALS
OXYGEN SATURATION: 96 % | RESPIRATION RATE: 17 BRPM | SYSTOLIC BLOOD PRESSURE: 131 MMHG | HEIGHT: 72 IN | TEMPERATURE: 97.5 F | BODY MASS INDEX: 39.03 KG/M2 | DIASTOLIC BLOOD PRESSURE: 73 MMHG | WEIGHT: 288.14 LBS | HEART RATE: 80 BPM

## 2025-07-23 LAB
ANION GAP SERPL CALC-SCNC: 10 MMOL/L (ref 10–20)
ATRIAL RATE: 98 BPM
BUN SERPL-MCNC: 18 MG/DL (ref 6–23)
CALCIUM SERPL-MCNC: 9.3 MG/DL (ref 8.6–10.3)
CHLORIDE SERPL-SCNC: 104 MMOL/L (ref 98–107)
CO2 SERPL-SCNC: 29 MMOL/L (ref 21–32)
CREAT SERPL-MCNC: 0.65 MG/DL (ref 0.5–1.3)
EGFRCR SERPLBLD CKD-EPI 2021: >90 ML/MIN/1.73M*2
ERYTHROCYTE [DISTWIDTH] IN BLOOD BY AUTOMATED COUNT: 14 % (ref 11.5–14.5)
GLUCOSE BLD MANUAL STRIP-MCNC: 175 MG/DL (ref 74–99)
GLUCOSE BLD MANUAL STRIP-MCNC: 319 MG/DL (ref 74–99)
GLUCOSE SERPL-MCNC: 196 MG/DL (ref 74–99)
HCT VFR BLD AUTO: 44.7 % (ref 41–52)
HGB BLD-MCNC: 15.3 G/DL (ref 13.5–17.5)
HOLD SPECIMEN: NORMAL
HOLD SPECIMEN: NORMAL
MCH RBC QN AUTO: 29.8 PG (ref 26–34)
MCHC RBC AUTO-ENTMCNC: 34.2 G/DL (ref 32–36)
MCV RBC AUTO: 87 FL (ref 80–100)
NRBC BLD-RTO: 0 /100 WBCS (ref 0–0)
P AXIS: 49 DEGREES
P OFFSET: 192 MS
P ONSET: 131 MS
PLATELET # BLD AUTO: 195 X10*3/UL (ref 150–450)
POTASSIUM SERPL-SCNC: 4 MMOL/L (ref 3.5–5.3)
PR INTERVAL: 174 MS
Q ONSET: 218 MS
QRS COUNT: 16 BEATS
QRS DURATION: 130 MS
QT INTERVAL: 422 MS
QTC CALCULATION(BAZETT): 538 MS
QTC FREDERICIA: 497 MS
R AXIS: -63 DEGREES
RBC # BLD AUTO: 5.14 X10*6/UL (ref 4.5–5.9)
SODIUM SERPL-SCNC: 139 MMOL/L (ref 136–145)
T AXIS: 9 DEGREES
T OFFSET: 429 MS
VENTRICULAR RATE: 98 BPM
WBC # BLD AUTO: 14.4 X10*3/UL (ref 4.4–11.3)

## 2025-07-23 PROCEDURE — 99239 HOSP IP/OBS DSCHRG MGMT >30: CPT

## 2025-07-23 PROCEDURE — 2500000004 HC RX 250 GENERAL PHARMACY W/ HCPCS (ALT 636 FOR OP/ED): Mod: JZ

## 2025-07-23 PROCEDURE — 94640 AIRWAY INHALATION TREATMENT: CPT

## 2025-07-23 PROCEDURE — 2500000002 HC RX 250 W HCPCS SELF ADMINISTERED DRUGS (ALT 637 FOR MEDICARE OP, ALT 636 FOR OP/ED)

## 2025-07-23 PROCEDURE — 80048 BASIC METABOLIC PNL TOTAL CA: CPT

## 2025-07-23 PROCEDURE — 2500000005 HC RX 250 GENERAL PHARMACY W/O HCPCS

## 2025-07-23 PROCEDURE — 2500000001 HC RX 250 WO HCPCS SELF ADMINISTERED DRUGS (ALT 637 FOR MEDICARE OP)

## 2025-07-23 PROCEDURE — 36415 COLL VENOUS BLD VENIPUNCTURE: CPT

## 2025-07-23 PROCEDURE — 82947 ASSAY GLUCOSE BLOOD QUANT: CPT

## 2025-07-23 PROCEDURE — 85027 COMPLETE CBC AUTOMATED: CPT

## 2025-07-23 RX ORDER — GUAIFENESIN 600 MG/1
600 TABLET, EXTENDED RELEASE ORAL 2 TIMES DAILY
Qty: 20 TABLET | Refills: 0 | Status: SHIPPED | OUTPATIENT
Start: 2025-07-23 | End: 2025-08-02

## 2025-07-23 RX ORDER — IPRATROPIUM BROMIDE AND ALBUTEROL SULFATE 2.5; .5 MG/3ML; MG/3ML
3 SOLUTION RESPIRATORY (INHALATION) EVERY 2 HOUR PRN
Status: DISCONTINUED | OUTPATIENT
Start: 2025-07-23 | End: 2025-07-23 | Stop reason: HOSPADM

## 2025-07-23 RX ORDER — PREDNISONE 20 MG/1
40 TABLET ORAL DAILY
Qty: 6 TABLET | Refills: 0 | Status: SHIPPED | OUTPATIENT
Start: 2025-07-23 | End: 2025-07-25 | Stop reason: ALTCHOICE

## 2025-07-23 RX ORDER — AMOXICILLIN AND CLAVULANATE POTASSIUM 875; 125 MG/1; MG/1
1 TABLET, FILM COATED ORAL 2 TIMES DAILY
Qty: 10 TABLET | Refills: 0 | Status: SHIPPED | OUTPATIENT
Start: 2025-07-23 | End: 2025-07-28

## 2025-07-23 RX ORDER — AZITHROMYCIN 500 MG/1
500 TABLET, FILM COATED ORAL DAILY
Qty: 1 TABLET | Refills: 0 | Status: SHIPPED | OUTPATIENT
Start: 2025-07-23 | End: 2025-07-25 | Stop reason: ALTCHOICE

## 2025-07-23 RX ADMIN — VALSARTAN: 160 TABLET, FILM COATED ORAL at 08:16

## 2025-07-23 RX ADMIN — CARVEDILOL 12.5 MG: 12.5 TABLET, FILM COATED ORAL at 08:16

## 2025-07-23 RX ADMIN — Medication: at 08:17

## 2025-07-23 RX ADMIN — INSULIN LISPRO 12 UNITS: 100 INJECTION, SOLUTION INTRAVENOUS; SUBCUTANEOUS at 11:24

## 2025-07-23 RX ADMIN — PANTOPRAZOLE SODIUM 40 MG: 40 TABLET, DELAYED RELEASE ORAL at 08:16

## 2025-07-23 RX ADMIN — ROSUVASTATIN CALCIUM 20 MG: 20 TABLET, FILM COATED ORAL at 08:16

## 2025-07-23 RX ADMIN — GUAIFENESIN 600 MG: 600 TABLET, EXTENDED RELEASE ORAL at 08:16

## 2025-07-23 RX ADMIN — METHYLPREDNISOLONE SODIUM SUCCINATE 40 MG: 40 INJECTION, POWDER, LYOPHILIZED, FOR SOLUTION INTRAMUSCULAR; INTRAVENOUS at 04:13

## 2025-07-23 RX ADMIN — INSULIN LISPRO 3 UNITS: 100 INJECTION, SOLUTION INTRAVENOUS; SUBCUTANEOUS at 06:39

## 2025-07-23 RX ADMIN — IPRATROPIUM BROMIDE AND ALBUTEROL SULFATE 3 ML: 2.5; .5 SOLUTION RESPIRATORY (INHALATION) at 07:46

## 2025-07-23 RX ADMIN — AMLODIPINE BESYLATE 5 MG: 5 TABLET ORAL at 08:16

## 2025-07-23 ASSESSMENT — PAIN SCALES - GENERAL: PAINLEVEL_OUTOF10: 0 - NO PAIN

## 2025-07-23 ASSESSMENT — COGNITIVE AND FUNCTIONAL STATUS - GENERAL
MOBILITY SCORE: 24
DAILY ACTIVITIY SCORE: 24

## 2025-07-23 ASSESSMENT — PAIN - FUNCTIONAL ASSESSMENT: PAIN_FUNCTIONAL_ASSESSMENT: 0-10

## 2025-07-23 NOTE — CARE PLAN
The patient's goals for the shift include      The clinical goals for the shift include pt will report improvement in respiratory status    Over the shift, the patient did not make progress toward the following goals. Barriers to progression include . Recommendations to address these barriers include .

## 2025-07-23 NOTE — DISCHARGE INSTRUCTIONS
You should start taking Augmentin twice daily x 5 days and azithromycin x 1 day for pneumonia  You should take 3 more days of prednisone  Follow up with your PCP within 1 week of discharge     Thank you for choosing [facility]. It has been a pleasure taking part in your medical care. Please follow up with your primary care provider as instructed. If your symptoms should persist or worsen, please contact your primary care physician, or in the case of an emergency proceed to the nearest Emergency Room for further care. If you have any questions about the care you received, please call [facility] at [phone]. Thank you again!

## 2025-07-23 NOTE — CARE PLAN
The patient's goals for the shift include      The clinical goals for the shift include pt will remain hemodynamically stable

## 2025-07-23 NOTE — DISCHARGE SUMMARY
Discharge Diagnosis  Pneumonia due to gram-negative bacteria (Multi)  Acute hypoxic respiratory failure  Elevated troponin  T2DM  Morbid obesity           Issues Requiring Follow-Up  Follow up with PCP    Discharge Meds     Medication List      START taking these medications     amoxicillin-clavulanate 875-125 mg tablet; Commonly known as: Augmentin;   Take 1 tablet by mouth 2 times a day for 5 days.   azithromycin 500 mg tablet; Commonly known as: Zithromax; Take 1 tablet   (500 mg) by mouth once daily for 1 day.   guaiFENesin 600 mg 12 hr tablet; Commonly known as: Mucinex; Take 1   tablet (600 mg) by mouth 2 times a day for 10 days. Do not crush, chew, or   split.   predniSONE 20 mg tablet; Commonly known as: Deltasone; Take 2 tablets   (40 mg) by mouth once daily for 3 days.     CONTINUE taking these medications     carvedilol 12.5 mg tablet; Commonly known as: Coreg; TAKE 1 TABLET (12.5   MG) BY MOUTH TWICE A DAY WITH MEALS   felodipine ER 5 mg 24 hr tablet; Commonly known as: Plendil; Take 1   tablet (5 mg) by mouth once daily. Do not crush, chew, or split.   Jardiance 25 mg tablet; Generic drug: empagliflozin   meloxicam 15 mg tablet; Commonly known as: Mobic; TAKE 1 TABLET BY MOUTH   EVERY DAY   metFORMIN 1,000 mg tablet; Commonly known as: Glucophage; Take 1 tablet   (1,000 mg) by mouth 2 times a day.   Ozempic 0.25 mg or 0.5 mg (2 mg/3 mL) pen injector; Generic drug:   semaglutide; INJECT 0.25 MG SUBCUTANEOUSLY WEEKLY   pantoprazole 40 mg EC tablet; Commonly known as: ProtoNix; TAKE 1 TABLET   BY MOUTH EVERY DAY   rosuvastatin 20 mg tablet; Commonly known as: Crestor; TAKE 1 TABLET BY   MOUTH EVERY DAY   telmisartan-hydrochlorothiazid 80-25 mg tablet; Commonly known as:   MIcarDIS HCT; Take 1 tablet by mouth once daily.   Tresiba FlexTouch U-200 200 unit/mL (3 mL) pen; Generic drug: insulin   degludec; Inject 50 Units under the skin 2 times a day.     ASK your doctor about these medications     Farxiga 5  mg tablet; Generic drug: dapagliflozin propanediol; TAKE 1   TABLET BY MOUTH EVERY DAY IN THE MORNING   glipiZIDE 10 mg tablet; Commonly known as: Glucotrol; Take 1 tablet (10   mg) by mouth 2 times a day before meals.       Test Results Pending At Discharge  Pending Labs       Order Current Status    Blood Culture Preliminary result    Blood Culture Preliminary result    Extra Tubes Preliminary result    Extra Tubes Preliminary result    SST TOP Preliminary result    SST TOP Preliminary result            Hospital Course   This is a 64-year-old male with past medical history including obesity, T2DM requiring insulin, hypertension, hyperlipidemia, and GERD who presented on 7/21 for worsening SOB secondary to community-acquired pneumonia.  On arrival, patient was found to be hypoxic, requiring BiPAP and transitioned to O2 via NC after treatments.  CTA chest was negative for PE but showed bilateral groundglass infiltrates with consolidation in the left lung base with a left pleural effusion.  He was started on IV Solu-Medrol, Mucinex, DuoNebs, Rocephin, and azithromycin.  Also given 1 dose of IV Lasix.  With treatment, the patient symptoms have significantly improved and he is now stable on room air.  During admission, labs revealed leukocytosis, however this is likely secondary to IV steroid use as patient has remained afebrile and WBCs are now downtrending.  Blood cultures are pending.  The patient will be discharged with Augmentin x 5 days and azithromycin x 1 day.  He will also be given prednisone 40 mg daily x 3 days and Mucinex for supportive treatment.  The patient can resume all other home medications.  He should follow-up with his PCP within 1 week of discharge.  The patient will be discharged home today.  He is hemodynamically stable at time of discharge.    Plan of care was discussed extensively with patient.  Patient verbalized understanding through teach back method.  All question and concerns addressed  upon examination.     Of note, this documentation is completed using the Dragon Dictation system (voice recognition software). There may be spelling and/or grammatical errors that were not corrected prior to final submission.      Pertinent Physical Exam At Time of Discharge  Physical Exam  Constitutional:       Appearance: Normal appearance. He is morbidly obese.      Comments: Stable on room air   HENT:      Head: Normocephalic.      Mouth/Throat:      Mouth: Mucous membranes are moist.     Eyes:      Pupils: Pupils are equal, round, and reactive to light.       Cardiovascular:      Rate and Rhythm: Normal rate and regular rhythm.      Heart sounds: Normal heart sounds, S1 normal and S2 normal.   Pulmonary:      Effort: Pulmonary effort is normal.      Breath sounds: Rhonchi (LLL) present.   Abdominal:      General: Bowel sounds are normal.      Palpations: Abdomen is soft.      Tenderness: There is no abdominal tenderness.     Musculoskeletal:         General: Normal range of motion.      Cervical back: Neck supple.      Comments: Trace BLE edema     Skin:     General: Skin is warm.     Neurological:      Mental Status: He is alert and oriented to person, place, and time.      Motor: No weakness.     Psychiatric:         Mood and Affect: Mood normal.         Behavior: Behavior normal.         Outpatient Follow-Up  Future Appointments   Date Time Provider Department Center   8/7/2025  8:15 AM Diaz Carrion MD [id number] Rufino Billingsley PA-C    I spent 35 minutes on discharge. Time calculated includes bedside evaluation, counseling, and outpatient care coordination.

## 2025-07-24 ENCOUNTER — PATIENT OUTREACH (OUTPATIENT)
Dept: PRIMARY CARE | Facility: CLINIC | Age: 65
End: 2025-07-24
Payer: COMMERCIAL

## 2025-07-24 NOTE — PROGRESS NOTES
Discharge Facility: [facility]  Discharge Diagnosis: Acute respiratory failure with hypoxia; Pneumonia of left lower lobe due to infectious organism   Admission Date: 7/21/25  Discharge Date: 7/23/25    PCP Appointment Date: 7/25/25  Specialist Appointment Date: None  Hospital Encounter and Summary Linked: Yes ED to Hosp-Admission (Discharged) with Max Kendall DO; Jose Luis NINO MD (07/21/2025)   See discharge assessment below for further details    Wrap Up  Wrap Up Additional Comments: I spoke to the patient. He states he is doing well since being discharged from the hospital. He said he is still feeling pretty weak but he is not having any shortness of breath. We reviewed his new medications and any changes made. I emphasized the importance of follow-up appointments with both his primary care physician and specialists to assess treatment response. I was able to get him in to see Dr. Carrion tomorrow. The patient is aware of my availability for non-emergency concerns and has been provided with my contact information. (7/24/2025  9:50 AM)    Engagement  Call Start Time: 0947 (7/24/2025  9:50 AM)    Medications  Medications reviewed with patient/caregiver?: Yes (7/24/2025  9:50 AM)  Is the patient having any side effects they believe may be caused by any medication additions or changes?: No (7/24/2025  9:50 AM)  Does the patient have all medications ordered at discharge?: Yes (7/24/2025  9:50 AM)  Care Management Interventions: No intervention needed (7/24/2025  9:50 AM)  Prescription Comments: new prescriptions: augmentin, azithromycin, guaifenesin, prednisone (7/24/2025  9:50 AM)  Is the patient taking all medications as directed (includes completed medication regime)?: Yes (7/24/2025  9:50 AM)  Care Management Interventions: Provided patient education (7/24/2025  9:50 AM)    Appointments  Does the patient have a primary care provider?: Yes (7/24/2025  9:50 AM)  Care Management Interventions: Verified  appointment date/time/provider (7/24/2025  9:50 AM)  Has the patient kept scheduled appointments due by today?: Yes (7/24/2025  9:50 AM)    Self Management  What is the home health agency?: Denies need (7/24/2025  9:50 AM)  What Durable Medical Equipment (DME) was ordered?: NA (7/24/2025  9:50 AM)    Patient Teaching  Does the patient have access to their discharge instructions?: Yes (7/24/2025  9:50 AM)  Care Management Interventions: Reviewed instructions with patient (7/24/2025  9:50 AM)  What is the patient's perception of their health status since discharge?: Improving (7/24/2025  9:50 AM)

## 2025-07-25 ENCOUNTER — OFFICE VISIT (OUTPATIENT)
Dept: PRIMARY CARE | Facility: CLINIC | Age: 65
End: 2025-07-25
Payer: COMMERCIAL

## 2025-07-25 VITALS
HEART RATE: 83 BPM | WEIGHT: 283 LBS | OXYGEN SATURATION: 96 % | TEMPERATURE: 95.9 F | DIASTOLIC BLOOD PRESSURE: 80 MMHG | HEIGHT: 72 IN | SYSTOLIC BLOOD PRESSURE: 120 MMHG | BODY MASS INDEX: 38.33 KG/M2

## 2025-07-25 DIAGNOSIS — J84.9 INTERSTITIAL LUNG DISEASE (MULTI): ICD-10-CM

## 2025-07-25 DIAGNOSIS — J18.9 PNEUMONIA OF BOTH LOWER LOBES DUE TO INFECTIOUS ORGANISM: Primary | ICD-10-CM

## 2025-07-25 DIAGNOSIS — E66.812 CLASS 2 SEVERE OBESITY DUE TO EXCESS CALORIES WITH SERIOUS COMORBIDITY AND BODY MASS INDEX (BMI) OF 38.0 TO 38.9 IN ADULT: ICD-10-CM

## 2025-07-25 DIAGNOSIS — J47.0 BRONCHIECTASIS WITH ACUTE LOWER RESPIRATORY INFECTION: ICD-10-CM

## 2025-07-25 DIAGNOSIS — E66.01 CLASS 2 SEVERE OBESITY DUE TO EXCESS CALORIES WITH SERIOUS COMORBIDITY AND BODY MASS INDEX (BMI) OF 38.0 TO 38.9 IN ADULT: ICD-10-CM

## 2025-07-25 PROCEDURE — 99496 TRANSJ CARE MGMT HIGH F2F 7D: CPT | Performed by: INTERNAL MEDICINE

## 2025-07-25 PROCEDURE — 1159F MED LIST DOCD IN RCRD: CPT | Performed by: INTERNAL MEDICINE

## 2025-07-25 PROCEDURE — 1036F TOBACCO NON-USER: CPT | Performed by: INTERNAL MEDICINE

## 2025-07-25 PROCEDURE — 3008F BODY MASS INDEX DOCD: CPT | Performed by: INTERNAL MEDICINE

## 2025-07-25 PROCEDURE — 3079F DIAST BP 80-89 MM HG: CPT | Performed by: INTERNAL MEDICINE

## 2025-07-25 PROCEDURE — 3074F SYST BP LT 130 MM HG: CPT | Performed by: INTERNAL MEDICINE

## 2025-07-25 PROCEDURE — 1111F DSCHRG MED/CURRENT MED MERGE: CPT | Performed by: INTERNAL MEDICINE

## 2025-07-25 ASSESSMENT — ENCOUNTER SYMPTOMS
DEPRESSION: 0
FATIGUE: 1
SHORTNESS OF BREATH: 0
ABDOMINAL PAIN: 0
LOSS OF SENSATION IN FEET: 0
WEAKNESS: 0
OCCASIONAL FEELINGS OF UNSTEADINESS: 0
NAUSEA: 0
COUGH: 1
ARTHRALGIAS: 1

## 2025-07-25 ASSESSMENT — COLUMBIA-SUICIDE SEVERITY RATING SCALE - C-SSRS
1. IN THE PAST MONTH, HAVE YOU WISHED YOU WERE DEAD OR WISHED YOU COULD GO TO SLEEP AND NOT WAKE UP?: NO
2. HAVE YOU ACTUALLY HAD ANY THOUGHTS OF KILLING YOURSELF?: NO
6. HAVE YOU EVER DONE ANYTHING, STARTED TO DO ANYTHING, OR PREPARED TO DO ANYTHING TO END YOUR LIFE?: NO

## 2025-07-25 ASSESSMENT — PATIENT HEALTH QUESTIONNAIRE - PHQ9
SUM OF ALL RESPONSES TO PHQ9 QUESTIONS 1 AND 2: 0
2. FEELING DOWN, DEPRESSED OR HOPELESS: NOT AT ALL
1. LITTLE INTEREST OR PLEASURE IN DOING THINGS: NOT AT ALL

## 2025-07-25 NOTE — PROGRESS NOTES
"Patient: Adolfo Sanford  : 1960  PCP: Diaz Carrion MD  MRN: [id number]  Program: Transitional Care Management  Status: Enrolled  Effective Dates: 2025 - present  Responsible Staff: Francis Hoover  Social Drivers to be Addressed: No information to display         Adolfo Sanford is a 64 y.o. male presenting today for follow-up after being discharged from the hospital 2 days ago. The main problem requiring admission was SOB. The discharge summary and/or Transitional Care Management documentation was reviewed. Medication reconciliation was performed as indicated via the \"Dilan as Reviewed\" timestamp.   Patient was hospitalized, he was having shortness of breath, he was hypoxic, basilar infiltrates were found, we are to understand that this patient has a traction bronchiectasis and subpleural reticulation, I am sure radiologist must have looked into this previous CT imaging and reported as a pneumonia, admitted, treated with IV antibiotics, felt better, here for follow-up, at the time of discharge WBC count was 14,000, he is not hypoxic, he is finishing Augmentin, he is blood sugars were reviewed, he is on 5 therapeutics for diabetes, he will not be able to go to work till , it was a acute care hospitalization for infectious element called pneumonia or respiratory infection.  Adolfo Sanford was contacted by Transitional Care Management services two days after his discharge. This encounter and supporting documentation was reviewed.    Review of Systems   Constitutional:  Positive for fatigue.   HENT:  Negative for congestion.    Respiratory:  Positive for cough. Negative for shortness of breath.    Cardiovascular:  Negative for chest pain.   Gastrointestinal:  Negative for abdominal pain and nausea.   Musculoskeletal:  Positive for arthralgias.   Neurological:  Negative for weakness.       /80 (BP Location: Left arm, Patient Position: Sitting, BP Cuff Size: Adult)   Pulse 83   " Temp 35.5 °C (95.9 °F) (Temporal)   Ht 1.829 m (6')   Wt 128 kg (283 lb)   SpO2 96%   BMI 38.38 kg/m²     Physical Exam  Constitutional:       Appearance: Normal appearance. He is obese.   HENT:      Head: Normocephalic.     Eyes:      Conjunctiva/sclera: Conjunctivae normal.       Cardiovascular:      Rate and Rhythm: Normal rate and regular rhythm.      Pulses: Normal pulses.      Heart sounds: Normal heart sounds.   Pulmonary:      Effort: Pulmonary effort is normal.      Breath sounds: Rales present.   Abdominal:      General: There is no distension.      Palpations: Abdomen is soft.      Tenderness: There is no abdominal tenderness.     Musculoskeletal:         General: Deformity present. No tenderness.      Cervical back: Neck supple.     Skin:     General: Skin is warm and dry.     Neurological:      Mental Status: He is oriented to person, place, and time. Mental status is at baseline.     Psychiatric:         Behavior: Behavior normal.         The complexity of medical decision making for this patient's transitional care is high.    Assessment/Plan   Problem List Items Addressed This Visit           ICD-10-CM    Interstitial lung disease (Multi) J84.9    Bronchiectasis with acute lower respiratory infection J47.0    Pneumonia of both lower lobes due to infectious organism - Primary J18.9   Recent hospitalization data and information reviewed, all reports, labs, radiological investigations and consultations and follow ups reviewed during this visit. Pt is doing well, precautions to be taken in the future for acute ailments or acute illness and change of condition are discussed, will continue to have close follow up, medication list was reviewed and updated and necessary changes were applied.  Hospitalization was reviewed, feels better, traction bronchiectasis and pulmonary fibrosis itself can make him susceptible to get this kind of infections, things are clearing, no much finding, hemodynamics are  stable, finish Augmentin, sugars should trend down once steroids are completed, other workup was reviewed, CT report was carefully reviewed, he states that lately his cough has been much better, I told him that the acute problem is resolving and he should be doing well, he can be going back to work from 5 August, he needs some time to recover, he is immune compromised and this kind of respiratory infection with underlying traction bronchiectasis and subpleural reticulation and fibrosis take some time and effort and treatment.  He will be seen in 4 months.  This will be considered as a transitional care related visit.

## 2025-07-26 LAB
BACTERIA BLD CULT: NORMAL
BACTERIA BLD CULT: NORMAL

## 2025-07-29 ENCOUNTER — PATIENT OUTREACH (OUTPATIENT)
Age: 65
End: 2025-07-29
Payer: COMMERCIAL

## 2025-07-29 NOTE — PROGRESS NOTES
Unable to reach patient for follow up call after recent hospitalization.   Left voicemail with call back number for patient to call if needed.    If no voicemail available call attempts x 2 were made to contact the patient to assist with any questions or concerns patient may have.

## 2025-07-30 LAB
ATRIAL RATE: 100 BPM
ATRIAL RATE: 128 BPM
ATRIAL RATE: 98 BPM
P AXIS: 21 DEGREES
P AXIS: 32 DEGREES
P AXIS: 49 DEGREES
P OFFSET: 183 MS
P OFFSET: 192 MS
P OFFSET: 199 MS
P ONSET: 128 MS
P ONSET: 131 MS
P ONSET: 159 MS
PR INTERVAL: 124 MS
PR INTERVAL: 150 MS
PR INTERVAL: 174 MS
Q ONSET: 203 MS
Q ONSET: 218 MS
Q ONSET: 221 MS
QRS COUNT: 16 BEATS
QRS COUNT: 16 BEATS
QRS COUNT: 22 BEATS
QRS DURATION: 130 MS
QRS DURATION: 130 MS
QRS DURATION: 146 MS
QT INTERVAL: 332 MS
QT INTERVAL: 414 MS
QT INTERVAL: 422 MS
QTC CALCULATION(BAZETT): 484 MS
QTC CALCULATION(BAZETT): 525 MS
QTC CALCULATION(BAZETT): 538 MS
QTC FREDERICIA: 427 MS
QTC FREDERICIA: 485 MS
QTC FREDERICIA: 497 MS
R AXIS: -63 DEGREES
R AXIS: -64 DEGREES
R AXIS: 122 DEGREES
T AXIS: -2 DEGREES
T AXIS: 27 DEGREES
T AXIS: 9 DEGREES
T OFFSET: 387 MS
T OFFSET: 410 MS
T OFFSET: 429 MS
VENTRICULAR RATE: 128 BPM
VENTRICULAR RATE: 97 BPM
VENTRICULAR RATE: 98 BPM

## 2025-07-31 DIAGNOSIS — E11.65 UNCONTROLLED TYPE 2 DIABETES MELLITUS WITH HYPERGLYCEMIA: Primary | ICD-10-CM

## 2025-07-31 NOTE — DOCUMENTATION CLARIFICATION NOTE
"    PATIENT:               YASH MEDINA  ACCT #:                  [id number]  MRN:                       [id number]  :                       1960  ADMIT DATE:       2025 8:08 AM  DISCH DATE:        2025 12:17 PM  RESPONDING PROVIDER #:        [id number]          PROVIDER RESPONSE TEXT:    Acute Myocardial Injury    CDI QUERY TEXT:    Clarification        Instruction:    Based on your assessment of the patient and the clinical information, please provide the requested documentation by clicking on the appropriate radio button and enter any additional information if prompted.    Question: Is there a diagnosis indicative of the patient elevated Troponins and symptoms    When answering this query, please exercise your independent professional judgment. The fact that a question is being asked, does not imply that any particular answer is desired or expected.    The patient's clinical indicators include:  Clinical Information:  - 63yo male admitted with ARF hypoxic, GN Pneumonia and elevated troponins.    Clinical Indicators:  -  Labs :   Wbc 8.5, hgb 18.9, plt 233, cr 0.70, la 2.2 - 1.1, bnp 478, trop 41 - 73   Labs :   Troponin 40    -    KAREN Billingsley : \" Elevated troponin.... Likely secondary to demand ischemia in the setting of acute hypoxia.  EKGs without significant changes \"    Treatment:  BiPAP and transitioned to O2 via NC after treatments.    Risk Factors: Pneumonia, ARF hypoxic.  Options provided:  -- Type II MI  -- Acute Myocardial Injury  -- Chronic Myocardial Injury  -- Demand ischemia  -- Other - I will add my own diagnosis  -- Refer to Clinical Documentation Reviewer    Query created by: Nicole Young on 2025 8:29 AM      Electronically signed by:  DEE KOROMA DO 2025 7:13 PM          "

## 2025-08-04 NOTE — PROGRESS NOTES
Pharmacy Post-Discharge Visit    Adolfo Sanford is a 64 y.o. male was referred to Clinical Pharmacy Team to complete a post-discharge medication optimization and monitoring visit.  The patient was referred for their Diabetes.    Admission Date: 7/21/2025  Discharge Date: 7/23/2025    Referring Provider: Diaz Carrion MD  PCP: Diaz Carrion MD - last visit: 2/6/2025, next visit: 12/2/2025      Subjective   Allergies[1]    [id number] - [place], [place] - [address]  [address] [address]  Phone: [phone] Fax: [phone]      Medication System Management:  Affordability/Accessibility: reports no concerns at this time   Adherence/Organization: concerned for adherence, patient seemed unsure about a few medications he is taking       Social History     Social History Narrative    Not on file        Notable Medication changes following discharge:  Start: antibiotics, steroid and cough suppressant for treatment of pneumonia   Stop: None  Change: None    HPI  Type II Diabetes  Current  Pharmacotherapy:   Metformin 1000 mg BID  Jardiance 25 mg daily   Ozempic 0.5 mg weekly  Glipizide 10 mg BID  Tresiba 72 units BID     SECONDARY PREVENTION  - Statin? Rosuvastatin 20 mg   LDL: 68   TC: 137  - ACE-I/ARB? none   UACR: 485  ALBUMIN/CREATININE RATIO, RANDOM URINE   Date Value Ref Range Status   03/03/2025 485 (H) <30 mg/g creat Final     Comment:        The ADA defines abnormalities in albumin  excretion as follows:     Albuminuria Category        Result (mg/g creatinine)     Normal to Mildly increased   <30  Moderately increased            Severely increased           > OR = 300     The ADA recommends that at least two of three  specimens collected within a 3-6 month period be  abnormal before considering a patient to be  within a diagnostic category.       Albumin/Creatinine Ratio   Date Value Ref Range Status   03/07/2024 63.0 (H) <30.0 ug/mg Creat Final     Albumin/Creatine Ratio   Date  "Value Ref Range Status   05/20/2022 64.9 (H) 0.0 - 30.0 ug/mg crt Final      BP: 120/80  - Aspirin? No       Current monitoring regimen:   Patient is using: finger sticks     SMBG Fasting Readings: reports he checks about once a month    Hypoglycemia? Unsure     Pertinent PMH Review:  - PMH of Pancreatitis: No  - PMH/FH of Medullary Thyroid Cancer: No  - PMH of Retinopathy: No  - PMH of Urinary Tract Infections: No    Complications:  - HEATHER: No  - CKD: No    -The 10-year ASCVD risk score (Gregorio BURNETTE, et al., 2019) is: 23.9%    Values used to calculate the score:      Age: 64 years      Clincally relevant sex: Male      Is Non- : Yes      Diabetic: Yes      Tobacco smoker: No      Systolic Blood Pressure: 120 mmHg      Is BP treated: Yes      HDL Cholesterol: 50 mg/dL      Total Cholesterol: 137 mg/dL      Diet/Lifestyle:    \"Gotten better\"  Does not exercise-  reports that it is \"not in his routine\"     Review of Systems    Objective     There were no vitals taken for this visit.   BP Readings from Last 4 Encounters:   07/25/25 120/80   07/23/25 131/73   02/06/25 122/78   07/11/24 120/78      There were no vitals filed for this visit.     LAB  Lab Results   Component Value Date    BILITOT 1.0 07/21/2025    CALCIUM 9.3 07/23/2025    CO2 29 07/23/2025     07/23/2025    CREATININE 0.65 07/23/2025    GLUCOSE 196 (H) 07/23/2025    ALKPHOS 71 07/21/2025    K 4.0 07/23/2025    PROT 8.1 07/21/2025     07/23/2025    AST 17 07/21/2025    ALT 14 07/21/2025    BUN 18 07/23/2025    ANIONGAP 10 07/23/2025    MG 1.84 07/21/2025    ALBUMIN 4.5 07/21/2025    GFRMALE >90 05/20/2022     Lab Results   Component Value Date    TRIG 105 03/03/2025    CHOL 137 03/03/2025    LDLCALC 68 03/03/2025    HDL 50 03/03/2025     Lab Results   Component Value Date    HGBA1C 10.3 (H) 03/03/2025         Current Outpatient Medications   Medication Instructions    carvedilol (COREG) 12.5 mg, oral, 2 times daily (morning " and late afternoon)    Farxiga 5 mg, oral, Daily before breakfast    felodipine ER (PLENDIL) 5 mg, oral, Daily, Do not crush, chew, or split.    glipiZIDE (GLUCOTROL) 10 mg, oral, 2 times daily before meals    Jardiance 25 mg, Daily with breakfast    meloxicam (MOBIC) 15 mg, oral, Daily    metFORMIN (GLUCOPHAGE) 1,000 mg, oral, 2 times daily    Ozempic 0.25 mg, subcutaneous, Once Weekly    pantoprazole (PROTONIX) 40 mg, oral, Daily    rosuvastatin (CRESTOR) 20 mg, oral, Daily    telmisartan-hydrochlorothiazid (MIcarDIS HCT) 80-25 mg tablet 1 tablet, oral, Daily    Tresiba FlexTouch U-200 50 Units, subcutaneous, 2 times daily        HISTORICAL PHARMACOTHERAPY  Novolog (unsure why it was discontinued)    DRUG INTERACTIONS  None at time of review      Assessment/Plan   Problem List Items Addressed This Visit       Uncontrolled type 2 diabetes mellitus with hyperglycemia - Primary    Patient has uncontrolled diabetes with an A1c of 10.3% (goal <7.5%).    Patient reports doing well on his current regimen with no side effects. He states he does not check his blood sugars regularly due to a dislike of finger sticks. He notes that his Endocrinologist has attempted to obtain a CGM, but it is not covered by his insurance. Counseled the patient on the importance of monitoring blood sugars, and he agreed to begin checking them daily. Will consider increasing Ozempic once the patient is able to provide blood sugar readings at his next visit.    Plan:   CONTINUE all medications as prescribed          Relevant Orders    Referral to Clinical Pharmacy     Follow Up: 8/25/2025 @ 3 PM    Continue all meds under the continuation of care with the referring provider and clinical pharmacy team.    Praveena Cadet, [place]  Clinical Pharmacy Specialist      Verbal consent to manage patient's drug therapy was obtained from the patient. They were informed they may decline to participate or withdraw from participation in pharmacy services at any  time.          [1]   Allergies  Allergen Reactions    Liraglutide Other

## 2025-08-05 ENCOUNTER — APPOINTMENT (OUTPATIENT)
Dept: PHARMACY | Facility: HOSPITAL | Age: 65
End: 2025-08-05
Payer: COMMERCIAL

## 2025-08-05 DIAGNOSIS — E11.65 UNCONTROLLED TYPE 2 DIABETES MELLITUS WITH HYPERGLYCEMIA: Primary | ICD-10-CM

## 2025-08-05 RX ORDER — IBUPROFEN 200 MG
CAPSULE ORAL
Qty: 100 EACH | Refills: 3 | Status: SHIPPED | OUTPATIENT
Start: 2025-08-05

## 2025-08-05 RX ORDER — LANCETS
EACH MISCELLANEOUS
Qty: 100 EACH | Refills: 3 | Status: SHIPPED | OUTPATIENT
Start: 2025-08-05

## 2025-08-05 RX ORDER — DEXTROSE 4 G
TABLET,CHEWABLE ORAL
Qty: 1 EACH | Refills: 0 | Status: SHIPPED | OUTPATIENT
Start: 2025-08-05

## 2025-08-05 NOTE — ASSESSMENT & PLAN NOTE
Patient has uncontrolled diabetes with an A1c of 10.3% (goal <7.5%).    Patient reports doing well on his current regimen with no side effects. He states he does not check his blood sugars regularly due to a dislike of finger sticks. He notes that his Endocrinologist has attempted to obtain a CGM, but it is not covered by his insurance. Counseled the patient on the importance of monitoring blood sugars, and he agreed to begin checking them daily. Will consider increasing Ozempic once the patient is able to provide blood sugar readings at his next visit.    Plan:   CONTINUE all medications as prescribed

## 2025-08-07 ENCOUNTER — APPOINTMENT (OUTPATIENT)
Dept: PRIMARY CARE | Facility: CLINIC | Age: 65
End: 2025-08-07
Payer: COMMERCIAL

## 2025-08-25 ENCOUNTER — APPOINTMENT (OUTPATIENT)
Dept: PHARMACY | Facility: HOSPITAL | Age: 65
End: 2025-08-25
Payer: COMMERCIAL

## 2025-08-25 DIAGNOSIS — M16.11 PRIMARY OSTEOARTHRITIS OF RIGHT HIP: ICD-10-CM

## 2025-08-25 DIAGNOSIS — M25.851 FEMOROACETABULAR IMPINGEMENT OF RIGHT HIP: ICD-10-CM

## 2025-08-25 DIAGNOSIS — M76.30 ILIOTIBIAL BAND SYNDROME, UNSPECIFIED LATERALITY: ICD-10-CM

## 2025-08-25 DIAGNOSIS — E11.65 UNCONTROLLED TYPE 2 DIABETES MELLITUS WITH HYPERGLYCEMIA: Primary | ICD-10-CM

## 2025-08-25 DIAGNOSIS — M70.61 TROCHANTERIC BURSITIS OF RIGHT HIP: ICD-10-CM

## 2025-08-25 DIAGNOSIS — G57.00 PIRIFORMIS SYNDROME, UNSPECIFIED LATERALITY: ICD-10-CM

## 2025-08-25 DIAGNOSIS — M67.959 TENDINOPATHY OF GLUTEAL REGION: ICD-10-CM

## 2025-08-25 RX ORDER — MELOXICAM 15 MG/1
15 TABLET ORAL DAILY
Qty: 30 TABLET | Refills: 1 | OUTPATIENT
Start: 2025-08-25

## 2025-08-29 DIAGNOSIS — I10 BENIGN ESSENTIAL HYPERTENSION: ICD-10-CM

## 2025-08-29 DIAGNOSIS — I10 ESSENTIAL (PRIMARY) HYPERTENSION: ICD-10-CM

## 2025-08-29 DIAGNOSIS — E11.9 TYPE 2 DIABETES MELLITUS WITHOUT COMPLICATIONS: ICD-10-CM

## 2025-08-29 RX ORDER — CARVEDILOL 12.5 MG/1
12.5 TABLET ORAL
Qty: 180 TABLET | Refills: 3 | Status: SHIPPED | OUTPATIENT
Start: 2025-08-29

## 2025-08-29 RX ORDER — METFORMIN HYDROCHLORIDE 1000 MG/1
1000 TABLET ORAL 2 TIMES DAILY
Qty: 180 TABLET | Refills: 3 | Status: SHIPPED | OUTPATIENT
Start: 2025-08-29

## 2025-08-29 RX ORDER — FELODIPINE 5 MG/1
5 TABLET, EXTENDED RELEASE ORAL DAILY
Qty: 90 TABLET | Refills: 3 | Status: SHIPPED | OUTPATIENT
Start: 2025-08-29

## 2025-09-26 ENCOUNTER — APPOINTMENT (OUTPATIENT)
Dept: PHARMACY | Facility: HOSPITAL | Age: 65
End: 2025-09-26
Payer: COMMERCIAL

## 2025-12-02 ENCOUNTER — APPOINTMENT (OUTPATIENT)
Dept: PRIMARY CARE | Facility: CLINIC | Age: 65
End: 2025-12-02
Payer: COMMERCIAL